# Patient Record
Sex: FEMALE | Race: WHITE | NOT HISPANIC OR LATINO | Employment: OTHER | URBAN - METROPOLITAN AREA
[De-identification: names, ages, dates, MRNs, and addresses within clinical notes are randomized per-mention and may not be internally consistent; named-entity substitution may affect disease eponyms.]

---

## 2019-02-27 ENCOUNTER — HOSPITAL ENCOUNTER (INPATIENT)
Facility: HOSPITAL | Age: 69
LOS: 3 days | Discharge: HOME HEALTH | DRG: 871 | End: 2019-03-02
Attending: FAMILY MEDICINE | Admitting: EMERGENCY MEDICINE
Payer: MEDICARE

## 2019-02-27 DIAGNOSIS — J11.00 PNEUMONIA AND INFLUENZA: Primary | ICD-10-CM

## 2019-02-27 DIAGNOSIS — Z71.6 ENCOUNTER FOR TOBACCO USE CESSATION COUNSELING: ICD-10-CM

## 2019-02-27 DIAGNOSIS — J11.1 INFLUENZA: ICD-10-CM

## 2019-02-27 DIAGNOSIS — R65.20 SEVERE SEPSIS: ICD-10-CM

## 2019-02-27 DIAGNOSIS — R09.02 HYPOXIA: ICD-10-CM

## 2019-02-27 DIAGNOSIS — J18.9 PNEUMONIA: ICD-10-CM

## 2019-02-27 DIAGNOSIS — A41.9 SEVERE SEPSIS: ICD-10-CM

## 2019-02-27 PROBLEM — J96.01 ACUTE RESPIRATORY FAILURE WITH HYPOXIA: Status: ACTIVE | Noted: 2019-02-27

## 2019-02-27 PROBLEM — N28.9 RENAL INSUFFICIENCY: Status: ACTIVE | Noted: 2019-02-27

## 2019-02-27 PROBLEM — J44.1 COPD EXACERBATION: Status: ACTIVE | Noted: 2019-02-27

## 2019-02-27 PROBLEM — I10 ESSENTIAL HYPERTENSION: Status: ACTIVE | Noted: 2019-02-27

## 2019-02-27 LAB
ALBUMIN SERPL BCP-MCNC: 4 G/DL
ALLENS TEST: ABNORMAL
ALP SERPL-CCNC: 78 U/L
ALT SERPL W/O P-5'-P-CCNC: 8 U/L
ANION GAP SERPL CALC-SCNC: 11 MMOL/L
AST SERPL-CCNC: 16 U/L
BASOPHILS # BLD AUTO: 0.02 K/UL
BASOPHILS NFR BLD: 0.3 %
BILIRUB SERPL-MCNC: 0.2 MG/DL
BILIRUB UR QL STRIP: NEGATIVE
BNP SERPL-MCNC: 59 PG/ML
BUN SERPL-MCNC: 12 MG/DL
CALCIUM SERPL-MCNC: 9.1 MG/DL
CHLORIDE SERPL-SCNC: 94 MMOL/L
CLARITY UR: CLEAR
CO2 SERPL-SCNC: 31 MMOL/L
COLOR UR: YELLOW
CREAT SERPL-MCNC: 1.1 MG/DL
DELSYS: ABNORMAL
DIFFERENTIAL METHOD: ABNORMAL
EOSINOPHIL # BLD AUTO: 0 K/UL
EOSINOPHIL NFR BLD: 0.3 %
ERYTHROCYTE [DISTWIDTH] IN BLOOD BY AUTOMATED COUNT: 13.9 %
EST. GFR  (AFRICAN AMERICAN): 60 ML/MIN/1.73 M^2
EST. GFR  (NON AFRICAN AMERICAN): 52 ML/MIN/1.73 M^2
FIO2: 21
GLUCOSE SERPL-MCNC: 111 MG/DL
GLUCOSE UR QL STRIP: NEGATIVE
HCO3 UR-SCNC: 31.4 MMOL/L (ref 24–28)
HCT VFR BLD AUTO: 43.3 %
HGB BLD-MCNC: 13.9 G/DL
HGB UR QL STRIP: ABNORMAL
INFLUENZA A, MOLECULAR: POSITIVE
INFLUENZA B, MOLECULAR: NEGATIVE
KETONES UR QL STRIP: NEGATIVE
LACTATE SERPL-SCNC: 0.8 MMOL/L
LACTATE SERPL-SCNC: 2.3 MMOL/L
LEUKOCYTE ESTERASE UR QL STRIP: NEGATIVE
LYMPHOCYTES # BLD AUTO: 0.9 K/UL
LYMPHOCYTES NFR BLD: 12.2 %
MAGNESIUM SERPL-MCNC: 2.4 MG/DL
MCH RBC QN AUTO: 32.1 PG
MCHC RBC AUTO-ENTMCNC: 32.1 G/DL
MCV RBC AUTO: 100 FL
MODE: ABNORMAL
MONOCYTES # BLD AUTO: 1.2 K/UL
MONOCYTES NFR BLD: 15.7 %
NEUTROPHILS # BLD AUTO: 5.5 K/UL
NEUTROPHILS NFR BLD: 71.5 %
NITRITE UR QL STRIP: NEGATIVE
PCO2 BLDA: 48.4 MMHG (ref 35–45)
PH SMN: 7.42 [PH] (ref 7.35–7.45)
PH UR STRIP: 6 [PH] (ref 5–8)
PLATELET # BLD AUTO: 214 K/UL
PMV BLD AUTO: 9.5 FL
PO2 BLDA: 47 MMHG (ref 80–100)
POC BE: 7 MMOL/L
POC SATURATED O2: 83 % (ref 95–100)
POTASSIUM SERPL-SCNC: 4 MMOL/L
PROCALCITONIN SERPL IA-MCNC: 0.46 NG/ML
PROT SERPL-MCNC: 7.5 G/DL
PROT UR QL STRIP: NEGATIVE
RBC # BLD AUTO: 4.33 M/UL
SAMPLE: ABNORMAL
SITE: ABNORMAL
SODIUM SERPL-SCNC: 136 MMOL/L
SP GR UR STRIP: <=1.005 (ref 1–1.03)
SPECIMEN SOURCE: ABNORMAL
TROPONIN I SERPL DL<=0.01 NG/ML-MCNC: <0.006 NG/ML
URN SPEC COLLECT METH UR: ABNORMAL
UROBILINOGEN UR STRIP-ACNC: NEGATIVE EU/DL
VANCOMYCIN TROUGH SERPL-MCNC: <1.1 UG/ML
WBC # BLD AUTO: 7.69 K/UL

## 2019-02-27 PROCEDURE — 94640 AIRWAY INHALATION TREATMENT: CPT

## 2019-02-27 PROCEDURE — 86803 HEPATITIS C AB TEST: CPT

## 2019-02-27 PROCEDURE — 84145 PROCALCITONIN (PCT): CPT

## 2019-02-27 PROCEDURE — 63600175 PHARM REV CODE 636 W HCPCS: Performed by: FAMILY MEDICINE

## 2019-02-27 PROCEDURE — 87040 BLOOD CULTURE FOR BACTERIA: CPT | Mod: 59

## 2019-02-27 PROCEDURE — 80053 COMPREHEN METABOLIC PANEL: CPT

## 2019-02-27 PROCEDURE — 83880 ASSAY OF NATRIURETIC PEPTIDE: CPT

## 2019-02-27 PROCEDURE — 96375 TX/PRO/DX INJ NEW DRUG ADDON: CPT

## 2019-02-27 PROCEDURE — 85025 COMPLETE CBC W/AUTO DIFF WBC: CPT

## 2019-02-27 PROCEDURE — 80202 ASSAY OF VANCOMYCIN: CPT

## 2019-02-27 PROCEDURE — 83605 ASSAY OF LACTIC ACID: CPT | Mod: 91

## 2019-02-27 PROCEDURE — 87502 INFLUENZA DNA AMP PROBE: CPT

## 2019-02-27 PROCEDURE — 96367 TX/PROPH/DG ADDL SEQ IV INF: CPT

## 2019-02-27 PROCEDURE — 99291 CRITICAL CARE FIRST HOUR: CPT | Mod: 25

## 2019-02-27 PROCEDURE — 25000003 PHARM REV CODE 250: Performed by: EMERGENCY MEDICINE

## 2019-02-27 PROCEDURE — 25000003 PHARM REV CODE 250: Performed by: NURSE PRACTITIONER

## 2019-02-27 PROCEDURE — 63600175 PHARM REV CODE 636 W HCPCS: Performed by: NURSE PRACTITIONER

## 2019-02-27 PROCEDURE — 93005 ELECTROCARDIOGRAM TRACING: CPT

## 2019-02-27 PROCEDURE — 81003 URINALYSIS AUTO W/O SCOPE: CPT

## 2019-02-27 PROCEDURE — 93010 ELECTROCARDIOGRAM REPORT: CPT | Mod: ,,, | Performed by: INTERNAL MEDICINE

## 2019-02-27 PROCEDURE — 25000242 PHARM REV CODE 250 ALT 637 W/ HCPCS: Performed by: NURSE PRACTITIONER

## 2019-02-27 PROCEDURE — 96365 THER/PROPH/DIAG IV INF INIT: CPT

## 2019-02-27 PROCEDURE — 36600 WITHDRAWAL OF ARTERIAL BLOOD: CPT

## 2019-02-27 PROCEDURE — 93010 EKG 12-LEAD: ICD-10-PCS | Mod: ,,, | Performed by: INTERNAL MEDICINE

## 2019-02-27 PROCEDURE — 96361 HYDRATE IV INFUSION ADD-ON: CPT

## 2019-02-27 PROCEDURE — 99900035 HC TECH TIME PER 15 MIN (STAT)

## 2019-02-27 PROCEDURE — G0378 HOSPITAL OBSERVATION PER HR: HCPCS

## 2019-02-27 PROCEDURE — 83735 ASSAY OF MAGNESIUM: CPT

## 2019-02-27 PROCEDURE — 63600175 PHARM REV CODE 636 W HCPCS: Performed by: EMERGENCY MEDICINE

## 2019-02-27 PROCEDURE — 84484 ASSAY OF TROPONIN QUANT: CPT

## 2019-02-27 PROCEDURE — 83605 ASSAY OF LACTIC ACID: CPT

## 2019-02-27 PROCEDURE — 21400001 HC TELEMETRY ROOM

## 2019-02-27 PROCEDURE — 96372 THER/PROPH/DIAG INJ SC/IM: CPT | Mod: 59 | Performed by: FAMILY MEDICINE

## 2019-02-27 PROCEDURE — 25000003 PHARM REV CODE 250: Performed by: FAMILY MEDICINE

## 2019-02-27 PROCEDURE — 82803 BLOOD GASES ANY COMBINATION: CPT

## 2019-02-27 PROCEDURE — 36415 COLL VENOUS BLD VENIPUNCTURE: CPT

## 2019-02-27 PROCEDURE — S4991 NICOTINE PATCH NONLEGEND: HCPCS | Performed by: NURSE PRACTITIONER

## 2019-02-27 PROCEDURE — 96368 THER/DIAG CONCURRENT INF: CPT

## 2019-02-27 RX ORDER — AMLODIPINE BESYLATE 10 MG/1
10 TABLET ORAL DAILY
COMMUNITY

## 2019-02-27 RX ORDER — ACETAMINOPHEN 325 MG/1
650 TABLET ORAL EVERY 8 HOURS PRN
Status: DISCONTINUED | OUTPATIENT
Start: 2019-02-27 | End: 2019-03-02 | Stop reason: HOSPADM

## 2019-02-27 RX ORDER — SODIUM CHLORIDE 9 MG/ML
INJECTION, SOLUTION INTRAVENOUS CONTINUOUS
Status: DISCONTINUED | OUTPATIENT
Start: 2019-02-27 | End: 2019-03-02 | Stop reason: HOSPADM

## 2019-02-27 RX ORDER — METHYLPREDNISOLONE SOD SUCC 125 MG
125 VIAL (EA) INJECTION ONCE
Status: COMPLETED | OUTPATIENT
Start: 2019-02-27 | End: 2019-02-27

## 2019-02-27 RX ORDER — SODIUM CHLORIDE 0.9 % (FLUSH) 0.9 %
5 SYRINGE (ML) INJECTION
Status: DISCONTINUED | OUTPATIENT
Start: 2019-02-27 | End: 2019-03-02 | Stop reason: HOSPADM

## 2019-02-27 RX ORDER — GABAPENTIN 400 MG/1
800 CAPSULE ORAL 3 TIMES DAILY
Status: DISCONTINUED | OUTPATIENT
Start: 2019-02-27 | End: 2019-03-02 | Stop reason: HOSPADM

## 2019-02-27 RX ORDER — MONTELUKAST SODIUM 10 MG/1
10 TABLET ORAL DAILY
COMMUNITY

## 2019-02-27 RX ORDER — BUDESONIDE 0.5 MG/2ML
0.5 INHALANT ORAL EVERY 12 HOURS
Status: DISCONTINUED | OUTPATIENT
Start: 2019-02-27 | End: 2019-03-02 | Stop reason: HOSPADM

## 2019-02-27 RX ORDER — BUDESONIDE AND FORMOTEROL FUMARATE DIHYDRATE 160; 4.5 UG/1; UG/1
2 AEROSOL RESPIRATORY (INHALATION) 2 TIMES DAILY
COMMUNITY
Start: 2019-01-10 | End: 2019-07-09

## 2019-02-27 RX ORDER — FAMOTIDINE 20 MG/1
20 TABLET, FILM COATED ORAL DAILY
Status: DISCONTINUED | OUTPATIENT
Start: 2019-02-27 | End: 2019-02-27

## 2019-02-27 RX ORDER — ONDANSETRON 2 MG/ML
4 INJECTION INTRAMUSCULAR; INTRAVENOUS EVERY 12 HOURS PRN
Status: DISCONTINUED | OUTPATIENT
Start: 2019-02-27 | End: 2019-02-27

## 2019-02-27 RX ORDER — DULOXETIN HYDROCHLORIDE 30 MG/1
60 CAPSULE, DELAYED RELEASE ORAL DAILY
Status: DISCONTINUED | OUTPATIENT
Start: 2019-02-28 | End: 2019-03-02 | Stop reason: HOSPADM

## 2019-02-27 RX ORDER — CYCLOBENZAPRINE HCL 10 MG
10 TABLET ORAL 3 TIMES DAILY PRN
COMMUNITY

## 2019-02-27 RX ORDER — OSELTAMIVIR PHOSPHATE 75 MG/1
75 CAPSULE ORAL
Status: COMPLETED | OUTPATIENT
Start: 2019-02-27 | End: 2019-02-27

## 2019-02-27 RX ORDER — ALBUTEROL SULFATE 90 UG/1
2 AEROSOL, METERED RESPIRATORY (INHALATION) 4 TIMES DAILY
COMMUNITY
Start: 2019-02-14

## 2019-02-27 RX ORDER — IPRATROPIUM BROMIDE AND ALBUTEROL SULFATE 2.5; .5 MG/3ML; MG/3ML
3 SOLUTION RESPIRATORY (INHALATION) EVERY 6 HOURS PRN
COMMUNITY
Start: 2019-01-10

## 2019-02-27 RX ORDER — IPRATROPIUM BROMIDE AND ALBUTEROL SULFATE 2.5; .5 MG/3ML; MG/3ML
3 SOLUTION RESPIRATORY (INHALATION) EVERY 4 HOURS
Status: DISCONTINUED | OUTPATIENT
Start: 2019-02-27 | End: 2019-03-02 | Stop reason: HOSPADM

## 2019-02-27 RX ORDER — GUAIFENESIN/DEXTROMETHORPHAN 100-10MG/5
10 SYRUP ORAL EVERY 4 HOURS PRN
Status: DISCONTINUED | OUTPATIENT
Start: 2019-02-27 | End: 2019-03-01

## 2019-02-27 RX ORDER — GABAPENTIN 800 MG/1
800 TABLET ORAL 3 TIMES DAILY
Refills: 2 | COMMUNITY
Start: 2019-02-19

## 2019-02-27 RX ORDER — DULOXETIN HYDROCHLORIDE 60 MG/1
60 CAPSULE, DELAYED RELEASE ORAL DAILY
COMMUNITY

## 2019-02-27 RX ORDER — ESOMEPRAZOLE MAGNESIUM 40 MG/1
40 CAPSULE, DELAYED RELEASE ORAL DAILY
Refills: 4 | COMMUNITY
Start: 2019-02-18

## 2019-02-27 RX ORDER — ENOXAPARIN SODIUM 100 MG/ML
40 INJECTION SUBCUTANEOUS EVERY 24 HOURS
Status: DISCONTINUED | OUTPATIENT
Start: 2019-02-27 | End: 2019-03-02 | Stop reason: HOSPADM

## 2019-02-27 RX ORDER — MONTELUKAST SODIUM 10 MG/1
10 TABLET ORAL DAILY
Status: DISCONTINUED | OUTPATIENT
Start: 2019-02-28 | End: 2019-03-02 | Stop reason: HOSPADM

## 2019-02-27 RX ORDER — ESZOPICLONE 3 MG/1
3 TABLET, FILM COATED ORAL NIGHTLY
Refills: 2 | COMMUNITY
Start: 2019-02-05

## 2019-02-27 RX ORDER — ALPRAZOLAM 1 MG/1
1 TABLET ORAL 2 TIMES DAILY PRN
Refills: 2 | COMMUNITY
Start: 2019-02-20 | End: 2019-12-07

## 2019-02-27 RX ORDER — IPRATROPIUM BROMIDE AND ALBUTEROL SULFATE 2.5; .5 MG/3ML; MG/3ML
3 SOLUTION RESPIRATORY (INHALATION) EVERY 4 HOURS
Status: DISCONTINUED | OUTPATIENT
Start: 2019-02-27 | End: 2019-02-27

## 2019-02-27 RX ORDER — PANTOPRAZOLE SODIUM 40 MG/1
40 TABLET, DELAYED RELEASE ORAL DAILY
Status: DISCONTINUED | OUTPATIENT
Start: 2019-02-28 | End: 2019-03-02 | Stop reason: HOSPADM

## 2019-02-27 RX ORDER — HYDROCODONE BITARTRATE AND ACETAMINOPHEN 10; 325 MG/1; MG/1
1 TABLET ORAL EVERY 4 HOURS PRN
Status: DISCONTINUED | OUTPATIENT
Start: 2019-02-27 | End: 2019-03-02 | Stop reason: HOSPADM

## 2019-02-27 RX ORDER — ONDANSETRON 2 MG/ML
4 INJECTION INTRAMUSCULAR; INTRAVENOUS EVERY 8 HOURS PRN
Status: DISCONTINUED | OUTPATIENT
Start: 2019-02-27 | End: 2019-03-02 | Stop reason: HOSPADM

## 2019-02-27 RX ORDER — HYDROCODONE BITARTRATE AND ACETAMINOPHEN 10; 325 MG/1; MG/1
1 TABLET ORAL EVERY 4 HOURS PRN
Status: ON HOLD | COMMUNITY
End: 2019-03-02 | Stop reason: HOSPADM

## 2019-02-27 RX ORDER — AZELASTINE 1 MG/ML
2 SPRAY, METERED NASAL 2 TIMES DAILY
Refills: 2 | COMMUNITY
Start: 2019-02-20

## 2019-02-27 RX ORDER — METHYLPREDNISOLONE SOD SUCC 125 MG
60 VIAL (EA) INJECTION EVERY 8 HOURS
Status: DISCONTINUED | OUTPATIENT
Start: 2019-02-27 | End: 2019-03-01

## 2019-02-27 RX ORDER — ALPRAZOLAM 1 MG/1
1 TABLET ORAL 2 TIMES DAILY PRN
Status: DISCONTINUED | OUTPATIENT
Start: 2019-02-27 | End: 2019-03-02 | Stop reason: HOSPADM

## 2019-02-27 RX ORDER — IBUPROFEN 200 MG
1 TABLET ORAL DAILY
Status: DISCONTINUED | OUTPATIENT
Start: 2019-02-27 | End: 2019-03-02 | Stop reason: HOSPADM

## 2019-02-27 RX ORDER — BUDESONIDE AND FORMOTEROL FUMARATE DIHYDRATE 160; 4.5 UG/1; UG/1
1 AEROSOL RESPIRATORY (INHALATION) 2 TIMES DAILY
Refills: 5 | COMMUNITY
Start: 2019-02-23

## 2019-02-27 RX ADMIN — BUDESONIDE 0.5 MG: 0.5 SUSPENSION RESPIRATORY (INHALATION) at 09:02

## 2019-02-27 RX ADMIN — OSELTAMIVIR PHOSPHATE 75 MG: 75 CAPSULE ORAL at 04:02

## 2019-02-27 RX ADMIN — ENOXAPARIN SODIUM 40 MG: 100 INJECTION SUBCUTANEOUS at 09:02

## 2019-02-27 RX ADMIN — AZITHROMYCIN MONOHYDRATE 500 MG: 500 INJECTION, POWDER, LYOPHILIZED, FOR SOLUTION INTRAVENOUS at 04:02

## 2019-02-27 RX ADMIN — IPRATROPIUM BROMIDE AND ALBUTEROL SULFATE 3 ML: .5; 3 SOLUTION RESPIRATORY (INHALATION) at 09:02

## 2019-02-27 RX ADMIN — GABAPENTIN 800 MG: 400 CAPSULE ORAL at 09:02

## 2019-02-27 RX ADMIN — SODIUM CHLORIDE 1494 ML: 0.9 INJECTION, SOLUTION INTRAVENOUS at 03:02

## 2019-02-27 RX ADMIN — SODIUM CHLORIDE: 0.9 INJECTION, SOLUTION INTRAVENOUS at 06:02

## 2019-02-27 RX ADMIN — Medication 1 PATCH: at 09:02

## 2019-02-27 RX ADMIN — CEFTRIAXONE 2 G: 2 INJECTION, SOLUTION INTRAVENOUS at 04:02

## 2019-02-27 RX ADMIN — PIPERACILLIN AND TAZOBACTAM 4.5 G: 4; .5 INJECTION, POWDER, LYOPHILIZED, FOR SOLUTION INTRAVENOUS; PARENTERAL at 06:02

## 2019-02-27 RX ADMIN — METHYLPREDNISOLONE SODIUM SUCCINATE 125 MG: 125 INJECTION, POWDER, FOR SOLUTION INTRAMUSCULAR; INTRAVENOUS at 06:02

## 2019-02-27 RX ADMIN — FAMOTIDINE 20 MG: 20 TABLET, FILM COATED ORAL at 06:02

## 2019-02-27 NOTE — ED PROVIDER NOTES
SCRIBE #1 NOTE: I, Corinne Mack, am scribing for, and in the presence of, Cyndi Rubin MD. I have scribed the HPI, ROS, and PEx.     SCRIBE #2 NOTE: I, Eleonora Hernandez, am scribing for, and in the presence of,  Shayne Salvador Jr., MD. I have scribed the remaining portions of the note not scribed by Scribe #1.     History      Chief Complaint   Patient presents with    Influenza     + Flu / + pneumonia per [facility] clinic. Clinic states they couldnt get sats >90% after breathing treatments.        Review of patient's allergies indicates:  No Known Allergies     HPI   HPI    2/27/2019, 3:12 PM   History obtained from the patient      History of Present Illness: Jill Minaya is a 68 y.o. female patient who presents to the Emergency Department for further evaluation. Pt c/o cough which onset yesterday. Pt presented to [facility] today and was Dx with PNA and influenza A. Pt was referred to the ED for further evaluation. Symptoms are episodic and moderate in severity. No mitigating or exacerbating factors reported. Associated sxs include fever (Tmax 103.6). Patient denies any chills, congestion, rhinorrhea, Cp, SOB, N/V/D, abd pain, back pain, neck pain, HA, dizziness, and all other sxs at this time. Prior Tx includes Robitussin with little relief. No further complaints or concerns at this time.         Arrival mode: Personal vehicle    PCP: Ruth Ann Zhu MD       Past Medical History:  Past medical history reviewed not relevant      Past Surgical History:  Past surgical history reviewed not relevant      Family History:  Family history reviewed not relevant      Social History:  Social History    Social History Main Topics    Social History Main Topics    Smoking status: Unknown if ever smoked    Smokeless tobacco: Unknown if ever used    Alcohol Use: Unknown drinking history    Drug Use: Unknown if ever used    Sexual Activity: Unknown         ROS   Review of Systems   Constitutional: Positive for  fever (Tmax 103.6). Negative for chills.   Respiratory: Positive for cough. Negative for shortness of breath.    Cardiovascular: Negative for chest pain and leg swelling.   Gastrointestinal: Negative for abdominal pain, diarrhea, nausea and vomiting.   Musculoskeletal: Negative for back pain, neck pain and neck stiffness.   Skin: Negative for rash and wound.   Neurological: Negative for dizziness, light-headedness, numbness and headaches.   All other systems reviewed and are negative.    Physical Exam      Initial Vitals [02/27/19 1455]   BP Pulse Resp Temp SpO2   (!) 91/54 (!) 111 18 98.2 °F (36.8 °C) (!) 84 %      MAP       --          Physical Exam  Nursing Notes and Vital Signs Reviewed.  Constitutional: Patient is in no acute distress. Well-developed and well-nourished.  Head: Atraumatic. Normocephalic.  Eyes: PERRL. EOM intact. Conjunctivae are not pale. No scleral icterus.  ENT: Mucous membranes are moist. Oropharynx is clear and symmetric.    Neck: Supple. Full ROM. No lymphadenopathy.  Cardiovascular: Tachycardic. Regular rhythm. No murmurs, rubs, or gallops. Distal pulses are 2+ and symmetric.  Pulmonary/Chest: No respiratory distress. Inspiratory and expiratory wheezes bilaterally with crackles noted.  Abdominal: Soft and non-distended.  There is no tenderness.  No rebound, guarding, or rigidity.   Musculoskeletal: Moves all extremities. No obvious deformities. No edema. No calf tenderness.  Skin: Warm and dry.  Neurological:  Alert, awake, and appropriate.  Normal speech.  No acute focal neurological deficits are appreciated.  Psychiatric: Normal affect. Good eye contact. Appropriate in content.    ED Course    Critical Care  Date/Time: 2/27/2019 5:50 PM  Performed by: Shayne Salvador Jr., MD  Authorized by: Shayne Salvador Jr., MD   Direct patient critical care time: 10 minutes  Additional history critical care time: 4 minutes  Ordering / reviewing critical care time: 4 minutes  Documentation critical  care time: 4 minutes  Consulting other physicians critical care time: 4 minutes  Consult with family critical care time: 4 minutes  Total critical care time (exclusive of procedural time) : 30 minutes  Critical care time was exclusive of separately billable procedures and treating other patients and teaching time.  Critical care was necessary to treat or prevent imminent or life-threatening deterioration of the following conditions: sepsis (Pneumonia and influenza).  Critical care was time spent personally by me on the following activities: blood draw for specimens, development of treatment plan with patient or surrogate, discussions with consultants, interpretation of cardiac output measurements, evaluation of patient's response to treatment, examination of patient, obtaining history from patient or surrogate, ordering and performing treatments and interventions, ordering and review of laboratory studies, ordering and review of radiographic studies, pulse oximetry, re-evaluation of patient's condition and review of old charts.        ED Vital Signs:  Vitals:    02/27/19 1455 02/27/19 1520 02/27/19 1521 02/27/19 1701   BP: (!) 91/54  (!) 91/55 (!) 112/51   Pulse: (!) 111 95 93 91   Resp: 18  (!) 21 16   Temp: 98.2 °F (36.8 °C)      TempSrc: Oral      SpO2: (!) 84%  98% 100%   Weight: 49.8 kg (109 lb 12.6 oz)      Height: 5' (1.524 m)          Abnormal Lab Results:  Labs Reviewed   CBC W/ AUTO DIFFERENTIAL - Abnormal; Notable for the following components:       Result Value     (*)     MCH 32.1 (*)     Lymph # 0.9 (*)     Mono # 1.2 (*)     Lymph% 12.2 (*)     Mono% 15.7 (*)     All other components within normal limits   COMPREHENSIVE METABOLIC PANEL - Abnormal; Notable for the following components:    Chloride 94 (*)     CO2 31 (*)     Glucose 111 (*)     ALT 8 (*)     eGFR if non  52 (*)     All other components within normal limits   LACTIC ACID, PLASMA - Abnormal; Notable for the following  components:    Lactate (Lactic Acid) 2.3 (*)     All other components within normal limits   URINALYSIS, REFLEX TO URINE CULTURE - Abnormal; Notable for the following components:    Specific Gravity, UA <=1.005 (*)     Occult Blood UA Trace (*)     All other components within normal limits    Narrative:     Preferred Collection Type->Urine, Clean Catch   PROCALCITONIN - Abnormal; Notable for the following components:    Procalcitonin 0.46 (*)     All other components within normal limits   ISTAT PROCEDURE - Abnormal; Notable for the following components:    POC PCO2 48.4 (*)     POC PO2 47 (*)     POC HCO3 31.4 (*)     POC SATURATED O2 83 (*)     All other components within normal limits   CULTURE, BLOOD   CULTURE, BLOOD   INFLUENZA A & B BY MOLECULAR   MAGNESIUM   B-TYPE NATRIURETIC PEPTIDE   TROPONIN I   HEPATITIS C ANTIBODY   LACTIC ACID, PLASMA        All Lab Results:  Results for orders placed or performed during the hospital encounter of 02/27/19   CBC auto differential   Result Value Ref Range    WBC 7.69 3.90 - 12.70 K/uL    RBC 4.33 4.00 - 5.40 M/uL    Hemoglobin 13.9 12.0 - 16.0 g/dL    Hematocrit 43.3 37.0 - 48.5 %     (H) 82 - 98 fL    MCH 32.1 (H) 27.0 - 31.0 pg    MCHC 32.1 32.0 - 36.0 g/dL    RDW 13.9 11.5 - 14.5 %    Platelets 214 150 - 350 K/uL    MPV 9.5 9.2 - 12.9 fL    Gran # (ANC) 5.5 1.8 - 7.7 K/uL    Lymph # 0.9 (L) 1.0 - 4.8 K/uL    Mono # 1.2 (H) 0.3 - 1.0 K/uL    Eos # 0.0 0.0 - 0.5 K/uL    Baso # 0.02 0.00 - 0.20 K/uL    Gran% 71.5 38.0 - 73.0 %    Lymph% 12.2 (L) 18.0 - 48.0 %    Mono% 15.7 (H) 4.0 - 15.0 %    Eosinophil% 0.3 0.0 - 8.0 %    Basophil% 0.3 0.0 - 1.9 %    Differential Method Automated    Comprehensive metabolic panel   Result Value Ref Range    Sodium 136 136 - 145 mmol/L    Potassium 4.0 3.5 - 5.1 mmol/L    Chloride 94 (L) 95 - 110 mmol/L    CO2 31 (H) 23 - 29 mmol/L    Glucose 111 (H) 70 - 110 mg/dL    BUN, Bld 12 8 - 23 mg/dL    Creatinine 1.1 0.5 - 1.4 mg/dL     Calcium 9.1 8.7 - 10.5 mg/dL    Total Protein 7.5 6.0 - 8.4 g/dL    Albumin 4.0 3.5 - 5.2 g/dL    Total Bilirubin 0.2 0.1 - 1.0 mg/dL    Alkaline Phosphatase 78 55 - 135 U/L    AST 16 10 - 40 U/L    ALT 8 (L) 10 - 44 U/L    Anion Gap 11 8 - 16 mmol/L    eGFR if African American 60 >60 mL/min/1.73 m^2    eGFR if non African American 52 (A) >60 mL/min/1.73 m^2   Lactic acid, plasma #1   Result Value Ref Range    Lactate (Lactic Acid) 2.3 (H) 0.5 - 2.2 mmol/L   Urinalysis, Reflex to Urine Culture Urine, Clean Catch   Result Value Ref Range    Specimen UA Urine, Clean Catch     Color, UA Yellow Yellow, Straw, [place]    Appearance, UA Clear Clear    pH, UA 6.0 5.0 - 8.0    Specific Gravity, UA <=1.005 (A) 1.005 - 1.030    Protein, UA Negative Negative    Glucose, UA Negative Negative    Ketones, UA Negative Negative    Bilirubin (UA) Negative Negative    Occult Blood UA Trace (A) Negative    Nitrite, UA Negative Negative    Urobilinogen, UA Negative <2.0 EU/dL    Leukocytes, UA Negative Negative   Magnesium   Result Value Ref Range    Magnesium 2.4 1.6 - 2.6 mg/dL   Brain natriuretic peptide   Result Value Ref Range    BNP 59 0 - 99 pg/mL   Troponin I   Result Value Ref Range    Troponin I <0.006 0.000 - 0.026 ng/mL   Procalcitonin   Result Value Ref Range    Procalcitonin 0.46 (H) <0.25 ng/mL   ISTAT PROCEDURE   Result Value Ref Range    POC PH 7.420 7.35 - 7.45    POC PCO2 48.4 (H) 35 - 45 mmHg    POC PO2 47 (LL) 80 - 100 mmHg    POC HCO3 31.4 (H) 24 - 28 mmol/L    POC BE 7 -2 to 2 mmol/L    POC SATURATED O2 83 (L) 95 - 100 %    Sample ARTERIAL     Site LR     Allens Test Pass     DelSys Room Air     Mode SPONT     FiO2 21        Imaging Results:  Imaging Results          X-Ray Chest PA And Lateral (Final result)  Result time 02/27/19 16:58:09    Final result by BRADLEY Dow Sr., MD (02/27/19 16:58:09)                 Impression:      1. There has been interval development of a mild amount of interstitial opacities  seen in both lungs with Kerley B-lines visualized bilaterally.  This is characteristic of pulmonary edema.  2. There are mild degenerative changes in the inferior aspect of the thoracic spine.  3. There is partial visualization of posterior spinal fusion hardware in the lumbar spine.  .      Electronically signed by: Emir Dow MD  Date:    02/27/2019  Time:    16:58             Narrative:    EXAMINATION:  XR CHEST PA AND LATERAL    CLINICAL HISTORY:  Pneumonia, unspecified organism    COMPARISON:  01/05/2010    FINDINGS:  The size and contour of the heart are normal.  There has been interval development of a mild amount of interstitial opacities seen in both lungs with Kerley B-lines visualized bilaterally.  There is no pneumothorax or pleural effusion.  There are mild degenerative changes in the inferior aspect of the thoracic spine.  There is partial visualization of posterior spinal fusion hardware in the lumbar spine.                             [facility]  [address]  [place] [address]  [phone] / [phone] FAX    Radiology Interpretation  PATIENT NAME: JUJU ALBARADO  YOB: 1950  ID/MRN: [id number]  CLINICIAN: Odette Jay  FACILITY: [facility]  DATE OF EXAM: 02/27/2019  HISTORY: [id number],    SIGNIFICANT FINDINGS  Chest X-Ray 2V:    Findings: The trachea is midline. The cardiac silhouette is normal. The lungs are mildly hyperinflated. Prominent bronchovascular markings. Biapical pleural thickening. Density seen in the lower lung zone, likely in the left lower lobe. Aortic atherosclerosis. No pneumothorax.     IMPRESSION:    1. Chronic airways disease.  2. Superimposed left lower lobe pneumonitis. Recommend follow-up.    Electronically Signed By: Dr. Louise Alves M.D. 02/27/2019 12:25:29 CST      Dr. Alves notified [employer] of significant findings. [facility] [employer] [facility] notified [facility]  [phone]) at [facility] of significant findings at 2019-02-27 12:31:31.    Tech: [email or web address]        The EKG was ordered, reviewed, and independently interpreted by the ED provider.  Interpretation time: 1513  Rate: 99 BPM  Rhythm: normal sinus rhythm  Interpretation: Biatrial enlargement. No STEMI.             The Emergency Provider reviewed the vital signs and test results, which are outlined above.    ED Discussion     4:00 PM: Dr. Rubin transfers care of pt to Dr. Salvador, pending lab results.    4:49 PM  Sepsis focused examination performed.  Vital signs have been reviewed.  Patient's skin is non mottled and non tenting.  Normal turgor. Normal pulses in extremities in normal capillary refill.  Cardiopulmonary exam is unchanged.  The patient did desaturate when she stood down to 78%.  Blood pressures improved 105 systolic.    5:49 PM: Discussed case with Morgan Lemus NP ([facility]). Dr. Reagan agrees with current care and management of pt and accepts admission.   Admitting Service: Hospital medicine   Admitting Physician: Dr. Reagan  Admit to: Obs    5:51 PM: Re-evaluated pt. I have discussed test results, shared treatment plan, and the need for admission with patient and family at bedside. Pt and family express understanding at this time and agree with all information. All questions answered. Pt and family have no further questions or concerns at this time. Pt is ready for admit.    5:54 PM  Patient is admitted for sepsis due to pneumonia and influenza.  Patient's hypoxia when she attempts to move around stand.  She presented hypotensive as well. After further discussion, patient had been in the hospital recently.  No change the antibiotics to include vanc and Zosyn at this time.  I have counseled the patient quit smoking as well at length.    ED Medication(s):  Medications   sodium chloride 0.9% bolus 1,494 mL (0 mL/kg × 49.8 kg Intravenous Stopped 2/27/19 4489)    oseltamivir capsule 75 mg (75 mg Oral Given 2/27/19 1600)   cefTRIAXone (ROCEPHIN) 2 g in dextrose 5 % 50 mL IVPB (0 g Intravenous Stopped 2/27/19 1701)   azithromycin 500 mg in dextrose 5 % 250 mL IVPB (ready to mix system) (0 mg Intravenous Stopped 2/27/19 1701)          Medication List      ASK your doctor about these medications    albuterol-ipratropium 2.5 mg-0.5 mg/3 mL nebulizer solution  Commonly known as:  DUO-NEB     ALPRAZolam 1 MG tablet  Commonly known as:  XANAX     amLODIPine 10 MG tablet  Commonly known as:  NORVASC     azelastine 137 mcg (0.1 %) nasal spray  Commonly known as:  ASTELIN     * budesonide-formoterol 160-4.5 mcg 160-4.5 mcg/actuation Hfaa  Commonly known as:  SYMBICORT     * SYMBICORT 160-4.5 mcg/actuation Hfaa  Generic drug:  budesonide-formoterol 160-4.5 mcg     cyclobenzaprine 10 MG tablet  Commonly known as:  FLEXERIL     DULoxetine 60 MG capsule  Commonly known as:  CYMBALTA     esomeprazole 40 MG capsule  Commonly known as:  NEXIUM     eszopiclone 3 mg Tab  Commonly known as:  LUNESTA     gabapentin 800 MG tablet  Commonly known as:  NEURONTIN     HYDROcodone-acetaminophen  mg per tablet  Commonly known as:  NORCO     ipratropium 17 mcg/actuation inhaler  Commonly known as:  ATROVENT HFA     montelukast 10 mg tablet  Commonly known as:  SINGULAIR     PROAIR HFA 90 mcg/actuation inhaler  Generic drug:  albuterol         * This list has 2 medication(s) that are the same as other medications prescribed for you. Read the directions carefully, and ask your doctor or other care provider to review them with you.                      Medical Decision Making    Medical Decision Making:   Clinical Tests:   Lab Tests: Ordered and Reviewed  Radiological Study: Ordered and Reviewed  Medical Tests: Ordered and Reviewed     Additional MDM:   Smoking Cessation: The patient is a smoker. The patient was counseled on smoking cessation for: 5 minutes. The patient was counseled on tobacco  related  health complications.        Scribe Attestation:   Scribe #1: I performed the above scribed service and the documentation accurately describes the services I performed. I attest to the accuracy of the note 02/27/2019.    Attending:   Physician Attestation Statement for Scribe #1: I, Cyndi Rubin MD, personally performed the services described in this documentation, as scribed by Corinne Mack, in my presence, and it is both accurate and complete.       Scribe Attestation:   Scribe #2: I performed the above scribed service and the documentation accurately describes the services I performed. I attest to the accuracy of the note.    Attending Attestation:           Physician Attestation for Scribe:    Physician Attestation Statement for Scribe #2: I, Shayne Salvador Jr., MD, reviewed documentation, as scribed by Eleonora Hernandez in my presence, and it is both accurate and complete. I also acknowledge and confirm the content of the note done by Scribe #1.          Clinical Impression       ICD-10-CM ICD-9-CM   1. Pneumonia and influenza J11.00 487.0   2. Influenza J11.1 487.1   3. Pneumonia J18.9 486   4. Severe sepsis A41.9 038.9    R65.20 995.92   5. Encounter for tobacco use cessation counseling Z71.6 V65.42   6. Hypoxia R09.02 799.02       Disposition:   Disposition: Placed in Observation  Condition: Sophia Salvador Jr., MD  02/27/19 3660

## 2019-02-28 LAB
ALBUMIN SERPL BCP-MCNC: 3.2 G/DL
ALP SERPL-CCNC: 58 U/L
ALT SERPL W/O P-5'-P-CCNC: 8 U/L
ANION GAP SERPL CALC-SCNC: 7 MMOL/L
AST SERPL-CCNC: 13 U/L
BASOPHILS # BLD AUTO: 0 K/UL
BASOPHILS NFR BLD: 0 %
BILIRUB SERPL-MCNC: 0.2 MG/DL
BUN SERPL-MCNC: 9 MG/DL
CALCIUM SERPL-MCNC: 8.5 MG/DL
CHLORIDE SERPL-SCNC: 101 MMOL/L
CO2 SERPL-SCNC: 31 MMOL/L
CREAT SERPL-MCNC: 0.6 MG/DL
DIFFERENTIAL METHOD: ABNORMAL
EOSINOPHIL # BLD AUTO: 0 K/UL
EOSINOPHIL NFR BLD: 0 %
ERYTHROCYTE [DISTWIDTH] IN BLOOD BY AUTOMATED COUNT: 13.8 %
EST. GFR  (AFRICAN AMERICAN): >60 ML/MIN/1.73 M^2
EST. GFR  (NON AFRICAN AMERICAN): >60 ML/MIN/1.73 M^2
GLUCOSE SERPL-MCNC: 123 MG/DL
HCT VFR BLD AUTO: 38.4 %
HCV AB SERPL QL IA: NEGATIVE
HGB BLD-MCNC: 12.2 G/DL
LYMPHOCYTES # BLD AUTO: 0.5 K/UL
LYMPHOCYTES NFR BLD: 12.1 %
MCH RBC QN AUTO: 31.6 PG
MCHC RBC AUTO-ENTMCNC: 31.8 G/DL
MCV RBC AUTO: 100 FL
MONOCYTES # BLD AUTO: 0.2 K/UL
MONOCYTES NFR BLD: 4.1 %
NEUTROPHILS # BLD AUTO: 3.3 K/UL
NEUTROPHILS NFR BLD: 83.8 %
PLATELET # BLD AUTO: 187 K/UL
PMV BLD AUTO: 9.5 FL
POTASSIUM SERPL-SCNC: 3.8 MMOL/L
PROT SERPL-MCNC: 6 G/DL
RBC # BLD AUTO: 3.86 M/UL
SODIUM SERPL-SCNC: 139 MMOL/L
VANCOMYCIN SERPL-MCNC: <1.1 UG/ML
WBC # BLD AUTO: 3.88 K/UL

## 2019-02-28 PROCEDURE — 63600175 PHARM REV CODE 636 W HCPCS: Performed by: NURSE PRACTITIONER

## 2019-02-28 PROCEDURE — 96375 TX/PRO/DX INJ NEW DRUG ADDON: CPT | Performed by: FAMILY MEDICINE

## 2019-02-28 PROCEDURE — 27000221 HC OXYGEN, UP TO 24 HOURS

## 2019-02-28 PROCEDURE — 25000242 PHARM REV CODE 250 ALT 637 W/ HCPCS: Performed by: NURSE PRACTITIONER

## 2019-02-28 PROCEDURE — 87070 CULTURE OTHR SPECIMN AEROBIC: CPT

## 2019-02-28 PROCEDURE — 25000003 PHARM REV CODE 250: Performed by: NURSE PRACTITIONER

## 2019-02-28 PROCEDURE — 87205 SMEAR GRAM STAIN: CPT

## 2019-02-28 PROCEDURE — 80202 ASSAY OF VANCOMYCIN: CPT

## 2019-02-28 PROCEDURE — S4991 NICOTINE PATCH NONLEGEND: HCPCS | Performed by: NURSE PRACTITIONER

## 2019-02-28 PROCEDURE — 94640 AIRWAY INHALATION TREATMENT: CPT

## 2019-02-28 PROCEDURE — 94761 N-INVAS EAR/PLS OXIMETRY MLT: CPT

## 2019-02-28 PROCEDURE — 36415 COLL VENOUS BLD VENIPUNCTURE: CPT

## 2019-02-28 PROCEDURE — 21400001 HC TELEMETRY ROOM

## 2019-02-28 PROCEDURE — 85025 COMPLETE CBC W/AUTO DIFF WBC: CPT

## 2019-02-28 PROCEDURE — 25000003 PHARM REV CODE 250: Performed by: EMERGENCY MEDICINE

## 2019-02-28 PROCEDURE — 96361 HYDRATE IV INFUSION ADD-ON: CPT | Performed by: FAMILY MEDICINE

## 2019-02-28 PROCEDURE — 80053 COMPREHEN METABOLIC PANEL: CPT

## 2019-02-28 RX ADMIN — IPRATROPIUM BROMIDE AND ALBUTEROL SULFATE 3 ML: .5; 3 SOLUTION RESPIRATORY (INHALATION) at 12:02

## 2019-02-28 RX ADMIN — ALPRAZOLAM 1 MG: 1 TABLET ORAL at 08:02

## 2019-02-28 RX ADMIN — GABAPENTIN 800 MG: 400 CAPSULE ORAL at 02:02

## 2019-02-28 RX ADMIN — HYDROCODONE BITARTRATE AND ACETAMINOPHEN 1 TABLET: 10; 325 TABLET ORAL at 11:02

## 2019-02-28 RX ADMIN — METHYLPREDNISOLONE SODIUM SUCCINATE 60 MG: 125 INJECTION, POWDER, FOR SOLUTION INTRAMUSCULAR; INTRAVENOUS at 06:02

## 2019-02-28 RX ADMIN — MONTELUKAST SODIUM 10 MG: 10 TABLET, FILM COATED ORAL at 08:02

## 2019-02-28 RX ADMIN — Medication 1 PATCH: at 09:02

## 2019-02-28 RX ADMIN — BUDESONIDE 0.5 MG: 0.5 SUSPENSION RESPIRATORY (INHALATION) at 07:02

## 2019-02-28 RX ADMIN — METHYLPREDNISOLONE SODIUM SUCCINATE 60 MG: 125 INJECTION, POWDER, FOR SOLUTION INTRAMUSCULAR; INTRAVENOUS at 08:02

## 2019-02-28 RX ADMIN — HYDROCODONE BITARTRATE AND ACETAMINOPHEN 1 TABLET: 10; 325 TABLET ORAL at 08:02

## 2019-02-28 RX ADMIN — IPRATROPIUM BROMIDE AND ALBUTEROL SULFATE 3 ML: .5; 3 SOLUTION RESPIRATORY (INHALATION) at 04:02

## 2019-02-28 RX ADMIN — METHYLPREDNISOLONE SODIUM SUCCINATE 60 MG: 125 INJECTION, POWDER, FOR SOLUTION INTRAMUSCULAR; INTRAVENOUS at 01:02

## 2019-02-28 RX ADMIN — ENOXAPARIN SODIUM 40 MG: 100 INJECTION SUBCUTANEOUS at 05:02

## 2019-02-28 RX ADMIN — DULOXETINE 60 MG: 30 CAPSULE, DELAYED RELEASE ORAL at 08:02

## 2019-02-28 RX ADMIN — AZITHROMYCIN MONOHYDRATE 500 MG: 500 INJECTION, POWDER, LYOPHILIZED, FOR SOLUTION INTRAVENOUS at 03:02

## 2019-02-28 RX ADMIN — GABAPENTIN 800 MG: 400 CAPSULE ORAL at 08:02

## 2019-02-28 RX ADMIN — IPRATROPIUM BROMIDE AND ALBUTEROL SULFATE 3 ML: .5; 3 SOLUTION RESPIRATORY (INHALATION) at 07:02

## 2019-02-28 RX ADMIN — BUDESONIDE 0.5 MG: 0.5 SUSPENSION RESPIRATORY (INHALATION) at 08:02

## 2019-02-28 RX ADMIN — SODIUM CHLORIDE: 0.9 INJECTION, SOLUTION INTRAVENOUS at 06:02

## 2019-02-28 RX ADMIN — ACETAMINOPHEN 650 MG: 325 TABLET ORAL at 03:02

## 2019-02-28 RX ADMIN — IPRATROPIUM BROMIDE AND ALBUTEROL SULFATE 3 ML: .5; 3 SOLUTION RESPIRATORY (INHALATION) at 08:02

## 2019-02-28 RX ADMIN — ALPRAZOLAM 1 MG: 1 TABLET ORAL at 03:02

## 2019-02-28 RX ADMIN — PANTOPRAZOLE SODIUM 40 MG: 40 TABLET, DELAYED RELEASE ORAL at 08:02

## 2019-02-28 RX ADMIN — ACETAMINOPHEN 650 MG: 325 TABLET ORAL at 02:02

## 2019-02-28 RX ADMIN — CEFTRIAXONE 2 G: 2 INJECTION, SOLUTION INTRAVENOUS at 02:02

## 2019-02-28 NOTE — PLAN OF CARE
Sw met with patient at the bedside. Sw explained role/purpose of visit. Patient reports she lives alone and is independent with ADL's. Patient reports she has HH, unsure of the name and currently has oxygen.  Updated white board with 's name and number. Transitional Care Folder, Discharge Planning Begins on Admission pamphlet, [facility] Bedside Delivery pamphlet, Advance Directive information given to patient along with the contact information given.Instructed patient or family to call with any questions or concerns.    Transportation: spouse  Pharmacy[facility]  PCP: Dr. Ruth Ann Zhu     02/28/19 2618   Discharge Assessment   Assessment Type Discharge Planning Assessment   Confirmed/corrected address and phone number on facesheet? Yes   Assessment information obtained from? Patient   Prior to hospitilization cognitive status: Alert/Oriented   Prior to hospitalization functional status: Independent   Current cognitive status: Alert/Oriented   Current Functional Status: Independent   Lives With alone   Able to Return to Prior Arrangements yes   Is patient able to care for self after discharge? Yes   Who are your caregiver(s) and their phone number(s)? Jose Minaya; spouse [phone]   Equipment Currently Used at Home oxygen   Does the patient have transportation home? Yes   Transportation Anticipated family or friend will provide   Does the patient receive services at the [facility]? No   Discharge Plan A Home   Discharge Plan B Home with family;Home Health   Patient/Family in Agreement with Plan yes

## 2019-02-28 NOTE — ASSESSMENT & PLAN NOTE
- (+) influenza at [facility] prior to arrival today  - Influenza pending  - Continue PO Tamiflu

## 2019-02-28 NOTE — ASSESSMENT & PLAN NOTE
- Borderline hypotensive  - Hold Amlodipine at this time. Will continue to monitor BP and will restart once BP can tolerate

## 2019-02-28 NOTE — PLAN OF CARE
Problem: Adult Inpatient Plan of Care  Goal: Plan of Care Review  Outcome: Ongoing (interventions implemented as appropriate)  Pt is on nc 3l/m; tolerates txs well; strong loose cough.

## 2019-02-28 NOTE — ED NOTES
In bed resting,aaox3,skin warm dry to touch,equal bilateral chest rise and fall, patient denies shortness of breath at this time lung sounds reveal course breath sounds in all lung fields ,side rails up x 2,bed locked and in low position,C-monitor on and recording 75 normal sinus,instructed to call with any needs.

## 2019-02-28 NOTE — ASSESSMENT & PLAN NOTE
- Pt is followed by Dr. Madelin Dos Santos (pulmonologist)  - IV steroids, ICS, LABA/SAKSHI, supplemental oxygen  - Last saw physician on 02/19/19 -- Per care everywhere -- performed 6-minute walk test and pulmonary function testing prior to clinic.   FEV1 is 27% consistent with very severe obstructive lung disease. However she did not require supplemental oxygen on her 6-minute walk test. He informed her that she no longer needs to use oxygen with exertion or at rest. She does claim to feel better with using oxygen at nighttime and hence I told her that she could continue to use it if she derived subjective benefit from it  - Obtain home oxygen evaluation prior to discharge if unable to wean

## 2019-02-28 NOTE — H&P
[facility] Hospital Medicine  History & Physical    Patient Name: Jill Minaya  MRN: [id number]  Admission Date: 2/27/2019  Attending Physician: Shayne Salvador Jr., MD   Primary Care Provider: Ruth Ann Zhu MD         Patient information was obtained from patient, spouse/SO and ER records.     Subjective:     Principal Problem:Acute respiratory failure with hypoxia    Chief Complaint:   Chief Complaint   Patient presents with    Influenza     + Flu / + pneumonia per [facility] clinic. Clinic states they couldnt get sats >90% after breathing treatments.         HPI: Jill Minaya is a 68 year old female with a PMHx of HTN, Asthma, COPD, Chronic back pain, and former tobacco user (quit 2 weeks ago) who presented to the Emergency Department with c/o SOB. Associated symptoms include: subjective fever (103.6), chills, productive cough with yellow sputum, and body aches. Pt reports symptoms started yesterday. Went to [facility] today and diagnosed with Flu and pneumonia. Pt was sent to ED due to hypoxia. ED workup showed:  Normal WBC count, Hgb/Hct 13.9/43.3, mild renal insufficiency, lactic acidosis (2.3), procalcitonin 0.46. ABG PO2 47. CXR showed pulmonary edema. Pt fluid resuscitated in the ED. CT chest pending.     No past medical history on file.    No past surgical history on file.    Review of patient's allergies indicates:  No Known Allergies    No current facility-administered medications on file prior to encounter.      Current Outpatient Medications on File Prior to Encounter   Medication Sig    albuterol (PROAIR HFA) 90 mcg/actuation inhaler 2 puffs 4 (four) times daily.    albuterol-ipratropium (DUO-NEB) 2.5 mg-0.5 mg/3 mL nebulizer solution Inhale 3 mLs into the lungs every 6 (six) hours as needed.    ALPRAZolam (XANAX) 1 MG tablet Take 1 mg by mouth 2 (two) times daily as needed for Anxiety.     amLODIPine (NORVASC) 10 MG tablet Take 10 mg by mouth once daily.     budesonide-formoterol 160-4.5 mcg (SYMBICORT) 160-4.5 mcg/actuation HFAA Inhale 2 puffs into the lungs 2 (two) times daily.    DULoxetine (CYMBALTA) 60 MG capsule Take 60 mg by mouth once daily.    esomeprazole (NEXIUM) 40 MG capsule Take 40 mg by mouth once daily.    eszopiclone (LUNESTA) 3 mg Tab Take 3 mg by mouth nightly.    gabapentin (NEURONTIN) 800 MG tablet Take 800 mg by mouth 3 (three) times daily.    HYDROcodone-acetaminophen (NORCO)  mg per tablet Take 1 tablet by mouth every 4 (four) hours as needed.    montelukast (SINGULAIR) 10 mg tablet Take 10 mg by mouth once daily.    azelastine (ASTELIN) 137 mcg (0.1 %) nasal spray 2 sprays by Nasal route 2 (two) times daily.    cyclobenzaprine (FLEXERIL) 10 MG tablet Take 10 mg by mouth 3 (three) times daily as needed.    ipratropium (ATROVENT HFA) 17 mcg/actuation inhaler Inhale 2 puffs into the lungs 2 (two) times daily.    SYMBICORT 160-4.5 mcg/actuation HFAA 1 puff 2 (two) times daily.     Family History     Reviewed. Not pertinent        Tobacco Use    Smoking status: Not on file   Substance and Sexual Activity    Alcohol use: Not on file    Drug use: Not on file    Sexual activity: Not on file     Review of Systems   Constitutional: Positive for activity change, appetite change, chills and fever.   HENT: Negative for trouble swallowing.    Eyes: Negative for visual disturbance.   Respiratory: Positive for cough, shortness of breath and wheezing. Negative for apnea, choking, chest tightness and stridor.    Cardiovascular: Negative for chest pain, palpitations and leg swelling.   Gastrointestinal: Negative for abdominal pain, constipation, diarrhea, nausea and vomiting.   Genitourinary: Negative for decreased urine volume, difficulty urinating, dysuria, frequency and urgency.   Musculoskeletal: Negative for arthralgias, back pain, gait problem, joint swelling, myalgias, neck pain and neck stiffness.   Skin: Negative for color change.    Neurological: Positive for weakness. Negative for dizziness, tremors, seizures, syncope, facial asymmetry, speech difficulty, light-headedness, numbness and headaches.   Psychiatric/Behavioral: Negative for agitation and behavioral problems.     Objective:     Vital Signs (Most Recent):  Temp: 98.2 °F (36.8 °C) (02/27/19 1455)  Pulse: 91 (02/27/19 1701)  Resp: 16 (02/27/19 1701)  BP: (!) 112/51 (02/27/19 1701)  SpO2: 100 % (02/27/19 1701) Vital Signs (24h Range):  Temp:  [98.2 °F (36.8 °C)] 98.2 °F (36.8 °C)  Pulse:  [] 91  Resp:  [16-21] 16  SpO2:  [84 %-100 %] 100 %  BP: ()/(51-55) 112/51     Weight: 49.8 kg (109 lb 12.6 oz)  Body mass index is 21.44 kg/m².    Physical Exam   Constitutional: She is oriented to person, place, and time. She appears well-developed. She is cooperative. She is easily aroused. She has a sickly appearance. Nasal cannula in place.   HENT:   Head: Normocephalic and atraumatic.   Eyes: EOM are normal.   Neck: Normal range of motion. Neck supple.   Cardiovascular: Normal rate, regular rhythm, normal heart sounds and intact distal pulses.   No murmur heard.  Pulmonary/Chest: No accessory muscle usage. Tachypnea noted. She has wheezes in the right upper field, the right middle field, the right lower field, the left upper field and the left lower field.   Abdominal: Soft. Bowel sounds are normal. She exhibits no distension. There is no tenderness. There is no rebound and no guarding.   Genitourinary:   Genitourinary Comments: Deferred   Musculoskeletal: Normal range of motion. She exhibits no edema, tenderness or deformity.   Neurological: She is alert, oriented to person, place, and time and easily aroused. No sensory deficit.   Skin: Skin is warm and dry. Capillary refill takes less than 2 seconds.   Psychiatric: She has a normal mood and affect. Her behavior is normal. Judgment and thought content normal.   Nursing note and vitals reviewed.        CRANIAL NERVES     CN III, IV,  VI   Extraocular motions are normal.        Significant Labs:   ABGs:   Recent Labs   Lab 02/27/19  1538   PH 7.420   PCO2 48.4*   HCO3 31.4*   POCSATURATED 83*   BE 7     CBC:   Recent Labs   Lab 02/27/19  1521   WBC 7.69   HGB 13.9   HCT 43.3        CMP:   Recent Labs   Lab 02/27/19  1521      K 4.0   CL 94*   CO2 31*   *   BUN 12   CREATININE 1.1   CALCIUM 9.1   PROT 7.5   ALBUMIN 4.0   BILITOT 0.2   ALKPHOS 78   AST 16   ALT 8*   ANIONGAP 11   EGFRNONAA 52*     Lactic Acid:   Recent Labs   Lab 02/27/19  1521   LACTATE 2.3*     Magnesium:   Recent Labs   Lab 02/27/19  1521   MG 2.4     Troponin:   Recent Labs   Lab 02/27/19  1521   TROPONINI <0.006     Urine Studies:   Recent Labs   Lab 02/27/19  1638   COLORU Yellow   APPEARANCEUA Clear   PHUR 6.0   SPECGRAV <=1.005*   PROTEINUA Negative   GLUCUA Negative   KETONESU Negative   BILIRUBINUA Negative   OCCULTUA Trace*   NITRITE Negative   UROBILINOGEN Negative   LEUKOCYTESUR Negative       Significant Imaging:   Imaging Results          CT Chest Without Contrast (In process)                X-Ray Chest PA And Lateral (Final result)  Result time 02/27/19 16:58:09    Final result by BRADLEY Dow Sr., MD (02/27/19 16:58:09)                 Impression:      1. There has been interval development of a mild amount of interstitial opacities seen in both lungs with Kerley B-lines visualized bilaterally.  This is characteristic of pulmonary edema.  2. There are mild degenerative changes in the inferior aspect of the thoracic spine.  3. There is partial visualization of posterior spinal fusion hardware in the lumbar spine.  .      Electronically signed by: Emir Dow MD  Date:    02/27/2019  Time:    16:58             Narrative:    EXAMINATION:  XR CHEST PA AND LATERAL    CLINICAL HISTORY:  Pneumonia, unspecified organism    COMPARISON:  01/05/2010    FINDINGS:  The size and contour of the heart are normal.  There has been interval development of  a mild amount of interstitial opacities seen in both lungs with Kerley B-lines visualized bilaterally.  There is no pneumothorax or pleural effusion.  There are mild degenerative changes in the inferior aspect of the thoracic spine.  There is partial visualization of posterior spinal fusion hardware in the lumbar spine.                              Assessment/Plan:     * Acute respiratory failure with hypoxia    - PO2 47. Likely secondary to influenza and COPD exacerbation  - CXR at [facility] findings reported pneumonia but CXR here reporting pulmonary edema. CT chest pending   - Will treat underlying cause for now with IV antibiotics, PO antiviral, IV steroids, LABA/SAKSHI/ICS, supplemental oxygen       Severe sepsis    - Severe sepsis criteria:  HR > 90, RR > 20, lactic acid > 2, source lungs  - Pt mildly hypotensive upon arrival to ED, systolic 90s. She received 30 ml/kg fluid resuscitation  - (+) influenza at [facility] prior to arrival  - Repeat influenza pending  - Blood cultures obtained, results pending  - Continue IV Rocephin/Azithromycin for tx for possible pneumonia. CT chest pending  - Continue PO Tamiflu for tx of flu       Influenza    - (+) influenza at [facility] prior to arrival today  - Influenza pending  - Continue PO Tamiflu       Renal insufficiency    - Creatinine 1.1   - Mild renal insufficiency  - Likely secondary to severe sepsis  - Pt received 30 ml/kg fluid resuscitation  - Repeat BMP in AM       Essential hypertension    - Borderline hypotensive  - Hold Amlodipine at this time. Will continue to monitor BP and will restart once BP can tolerate       COPD exacerbation    - Pt is followed by Dr. Madelin Dos Santos (pulmonologist)  - IV steroids, ICS, LABA/SAKSHI, supplemental oxygen  - Last saw physician on 02/19/19 -- Per care everywhere -- performed 6-minute walk test and pulmonary function testing prior to clinic.   FEV1 is 27% consistent with very severe obstructive lung  disease. However she did not require supplemental oxygen on her 6-minute walk test. He informed her that she no longer needs to use oxygen with exertion or at rest. She does claim to feel better with using oxygen at nighttime and hence I told her that she could continue to use it if she derived subjective benefit from it  - Obtain home oxygen evaluation prior to discharge if unable to wean         VTE Risk Mitigation (From admission, onward)        Ordered     enoxaparin injection 40 mg  Daily      02/27/19 1847     IP VTE LOW RISK PATIENT  Once      02/27/19 1804     Place ROME hose  Until discontinued      02/27/19 1804             AUSTIN Price  Department of Hospital Medicine   [facility]

## 2019-02-28 NOTE — ASSESSMENT & PLAN NOTE
- Severe sepsis criteria:  HR > 90, RR > 20, lactic acid > 2, source lungs  - Pt mildly hypotensive upon arrival to ED, systolic 90s. She received 30 ml/kg fluid resuscitation  - (+) influenza at [facility] prior to arrival  - Repeat influenza pending  - Blood cultures obtained, results pending  - Continue IV Rocephin/Azithromycin for tx for possible pneumonia. CT chest pending  - Continue PO Tamiflu for tx of flu

## 2019-02-28 NOTE — ASSESSMENT & PLAN NOTE
- PO2 47. Likely secondary to influenza and COPD exacerbation  - CXR at [facility] findings reported pneumonia but CXR here reporting pulmonary edema. CT chest pending   - Will treat underlying cause for now with IV antibiotics, PO antiviral, IV steroids, LABA/SAKSHI/ICS, supplemental oxygen

## 2019-02-28 NOTE — ASSESSMENT & PLAN NOTE
- Creatinine 1.1   - Mild renal insufficiency  - Likely secondary to severe sepsis  - Pt received 30 ml/kg fluid resuscitation  - Repeat BMP in AM

## 2019-02-28 NOTE — ASSESSMENT & PLAN NOTE
- Blood cultures show NGTD  - Continue IV Rocephin/Azithromycin   - Continue PO Tamiflu for tx of flu  Cont iVFs  Sputum culture pending

## 2019-02-28 NOTE — ASSESSMENT & PLAN NOTE
- Pt is followed by Dr. Madelin Dos Santos (pulmonologist)  - IV steroids, ICS, LABA/SAKSHI, supplemental oxygen  Pt reports she has home oxygen and portable tanks- but was told she did not need it

## 2019-02-28 NOTE — ASSESSMENT & PLAN NOTE
- PO2 47. Likely secondary to influenz, PNA,  and COPD exacerbation  Will treat underlying cause for now with IV antibiotics, PO antiviral, IV steroids, LABA/SAKSHI/ICS, supplemental oxygen

## 2019-02-28 NOTE — SUBJECTIVE & OBJECTIVE
No past medical history on file.    No past surgical history on file.    Review of patient's allergies indicates:  No Known Allergies    No current facility-administered medications on file prior to encounter.      Current Outpatient Medications on File Prior to Encounter   Medication Sig    albuterol (PROAIR HFA) 90 mcg/actuation inhaler 2 puffs 4 (four) times daily.    albuterol-ipratropium (DUO-NEB) 2.5 mg-0.5 mg/3 mL nebulizer solution Inhale 3 mLs into the lungs every 6 (six) hours as needed.    ALPRAZolam (XANAX) 1 MG tablet Take 1 mg by mouth 2 (two) times daily as needed for Anxiety.     amLODIPine (NORVASC) 10 MG tablet Take 10 mg by mouth once daily.    budesonide-formoterol 160-4.5 mcg (SYMBICORT) 160-4.5 mcg/actuation HFAA Inhale 2 puffs into the lungs 2 (two) times daily.    DULoxetine (CYMBALTA) 60 MG capsule Take 60 mg by mouth once daily.    esomeprazole (NEXIUM) 40 MG capsule Take 40 mg by mouth once daily.    eszopiclone (LUNESTA) 3 mg Tab Take 3 mg by mouth nightly.    gabapentin (NEURONTIN) 800 MG tablet Take 800 mg by mouth 3 (three) times daily.    HYDROcodone-acetaminophen (NORCO)  mg per tablet Take 1 tablet by mouth every 4 (four) hours as needed.    montelukast (SINGULAIR) 10 mg tablet Take 10 mg by mouth once daily.    azelastine (ASTELIN) 137 mcg (0.1 %) nasal spray 2 sprays by Nasal route 2 (two) times daily.    cyclobenzaprine (FLEXERIL) 10 MG tablet Take 10 mg by mouth 3 (three) times daily as needed.    ipratropium (ATROVENT HFA) 17 mcg/actuation inhaler Inhale 2 puffs into the lungs 2 (two) times daily.    SYMBICORT 160-4.5 mcg/actuation HFAA 1 puff 2 (two) times daily.     Family History     Reviewed. Not pertinent        Tobacco Use    Smoking status: Not on file   Substance and Sexual Activity    Alcohol use: Not on file    Drug use: Not on file    Sexual activity: Not on file     Review of Systems   Constitutional: Positive for activity change, appetite  change, chills and fever.   HENT: Negative for trouble swallowing.    Eyes: Negative for visual disturbance.   Respiratory: Positive for cough, shortness of breath and wheezing. Negative for apnea, choking, chest tightness and stridor.    Cardiovascular: Negative for chest pain, palpitations and leg swelling.   Gastrointestinal: Negative for abdominal pain, constipation, diarrhea, nausea and vomiting.   Genitourinary: Negative for decreased urine volume, difficulty urinating, dysuria, frequency and urgency.   Musculoskeletal: Negative for arthralgias, back pain, gait problem, joint swelling, myalgias, neck pain and neck stiffness.   Skin: Negative for color change.   Neurological: Positive for weakness. Negative for dizziness, tremors, seizures, syncope, facial asymmetry, speech difficulty, light-headedness, numbness and headaches.   Psychiatric/Behavioral: Negative for agitation and behavioral problems.     Objective:     Vital Signs (Most Recent):  Temp: 98.2 °F (36.8 °C) (02/27/19 1455)  Pulse: 91 (02/27/19 1701)  Resp: 16 (02/27/19 1701)  BP: (!) 112/51 (02/27/19 1701)  SpO2: 100 % (02/27/19 1701) Vital Signs (24h Range):  Temp:  [98.2 °F (36.8 °C)] 98.2 °F (36.8 °C)  Pulse:  [] 91  Resp:  [16-21] 16  SpO2:  [84 %-100 %] 100 %  BP: ()/(51-55) 112/51     Weight: 49.8 kg (109 lb 12.6 oz)  Body mass index is 21.44 kg/m².    Physical Exam   Constitutional: She is oriented to person, place, and time. She appears well-developed. She is cooperative. She is easily aroused. She has a sickly appearance. Nasal cannula in place.   HENT:   Head: Normocephalic and atraumatic.   Eyes: EOM are normal.   Neck: Normal range of motion. Neck supple.   Cardiovascular: Normal rate, regular rhythm, normal heart sounds and intact distal pulses.   No murmur heard.  Pulmonary/Chest: No accessory muscle usage. Tachypnea noted. She has wheezes in the right upper field, the right middle field, the right lower field, the left  upper field and the left lower field.   Abdominal: Soft. Bowel sounds are normal. She exhibits no distension. There is no tenderness. There is no rebound and no guarding.   Genitourinary:   Genitourinary Comments: Deferred   Musculoskeletal: Normal range of motion. She exhibits no edema, tenderness or deformity.   Neurological: She is alert, oriented to person, place, and time and easily aroused. No sensory deficit.   Skin: Skin is warm and dry. Capillary refill takes less than 2 seconds.   Psychiatric: She has a normal mood and affect. Her behavior is normal. Judgment and thought content normal.   Nursing note and vitals reviewed.        CRANIAL NERVES     CN III, IV, VI   Extraocular motions are normal.        Significant Labs:   ABGs:   Recent Labs   Lab 02/27/19  1538   PH 7.420   PCO2 48.4*   HCO3 31.4*   POCSATURATED 83*   BE 7     CBC:   Recent Labs   Lab 02/27/19  1521   WBC 7.69   HGB 13.9   HCT 43.3        CMP:   Recent Labs   Lab 02/27/19  1521      K 4.0   CL 94*   CO2 31*   *   BUN 12   CREATININE 1.1   CALCIUM 9.1   PROT 7.5   ALBUMIN 4.0   BILITOT 0.2   ALKPHOS 78   AST 16   ALT 8*   ANIONGAP 11   EGFRNONAA 52*     Lactic Acid:   Recent Labs   Lab 02/27/19  1521   LACTATE 2.3*     Magnesium:   Recent Labs   Lab 02/27/19  1521   MG 2.4     Troponin:   Recent Labs   Lab 02/27/19  1521   TROPONINI <0.006     Urine Studies:   Recent Labs   Lab 02/27/19  1638   COLORU Yellow   APPEARANCEUA Clear   PHUR 6.0   SPECGRAV <=1.005*   PROTEINUA Negative   GLUCUA Negative   KETONESU Negative   BILIRUBINUA Negative   OCCULTUA Trace*   NITRITE Negative   UROBILINOGEN Negative   LEUKOCYTESUR Negative       Significant Imaging:   Imaging Results          CT Chest Without Contrast (In process)                X-Ray Chest PA And Lateral (Final result)  Result time 02/27/19 16:58:09    Final result by BRADLEY Dow Sr., MD (02/27/19 16:58:09)                 Impression:      1. There has been  interval development of a mild amount of interstitial opacities seen in both lungs with Kerley B-lines visualized bilaterally.  This is characteristic of pulmonary edema.  2. There are mild degenerative changes in the inferior aspect of the thoracic spine.  3. There is partial visualization of posterior spinal fusion hardware in the lumbar spine.  .      Electronically signed by: Emir Dow MD  Date:    02/27/2019  Time:    16:58             Narrative:    EXAMINATION:  XR CHEST PA AND LATERAL    CLINICAL HISTORY:  Pneumonia, unspecified organism    COMPARISON:  01/05/2010    FINDINGS:  The size and contour of the heart are normal.  There has been interval development of a mild amount of interstitial opacities seen in both lungs with Kerley B-lines visualized bilaterally.  There is no pneumothorax or pleural effusion.  There are mild degenerative changes in the inferior aspect of the thoracic spine.  There is partial visualization of posterior spinal fusion hardware in the lumbar spine.

## 2019-02-28 NOTE — PLAN OF CARE
02/28/19 1146   LOVE Message   Medicare Outpatient and Observation Notification regarding financial responsibility Given to patient/caregiver;Explained to patient/caregiver;Signed/date by patient/caregiver   Date LOVE was signed 02/28/19   Time LOVE was signed 1134

## 2019-02-28 NOTE — PROGRESS NOTES
[facility] Hospital Medicine  Progress Note    Patient Name: Jill Minaya  MRN: [id number]  Patient Class: IP- Inpatient   Admission Date: 2/27/2019  Length of Stay: 0 days  Attending Physician: Melisa Reagan MD  Primary Care Provider: Ruth Ann Zhu MD        Subjective:     Principal Problem:Acute respiratory failure with hypoxia    HPI:  Jill Minaya is a 68 year old female with a PMHx of HTN, Asthma, COPD, Chronic back pain, and former tobacco user (quit 2 weeks ago) who presented to the Emergency Department with c/o SOB. Associated symptoms include: subjective fever (103.6), chills, productive cough with yellow sputum, and body aches. Pt reports symptoms started yesterday. Went to [facility] today and diagnosed with Flu and pneumonia. Pt was sent to ED due to hypoxia. ED workup showed:  Normal WBC count, Hgb/Hct 13.9/43.3, mild renal insufficiency, lactic acidosis (2.3), procalcitonin 0.46. ABG PO2 47. CXR showed pulmonary edema. Pt fluid resuscitated in the ED. CT chest pending.     Hospital Course:  The pt is a 69 yo female with HTN, Asthma, severe COPD, Chronic back pain, and tobacco use who was placed in observation with Severe sepsis and bilateral pneumonia, Acute respiratory failure with hypoxia, COPD exacerbation, and Influenza A on IV Rocephin, IV Azithromycin, IVFs, IV steroids, and Tamiflu.   The pt reports symptoms have not improved. She remains SOB above baseline and loose cough.  Pt reports she has home oxygen but was not wearing it for several months- she reports she was told she does not need it.   Afebrile, WBC normal         Interval History: +SOB above baseline, loose cough, wheezing, fatigue     Review of Systems   Constitutional: Positive for activity change and fatigue. Negative for appetite change, chills, diaphoresis, fever and unexpected weight change.   HENT: Negative for congestion, nosebleeds, sinus pressure and sore throat.    Eyes: Negative for  pain, discharge and visual disturbance.   Respiratory: Positive for cough and shortness of breath. Negative for chest tightness, wheezing and stridor.    Cardiovascular: Negative for chest pain, palpitations and leg swelling.   Gastrointestinal: Negative for abdominal distention, abdominal pain, blood in stool, constipation, diarrhea, nausea and vomiting.   Endocrine: Negative for cold intolerance and heat intolerance.   Genitourinary: Negative for difficulty urinating, dysuria, flank pain, frequency and urgency.   Musculoskeletal: Negative for arthralgias, back pain, joint swelling, myalgias, neck pain and neck stiffness.   Skin: Negative for rash and wound.   Allergic/Immunologic: Negative for food allergies and immunocompromised state.   Neurological: Positive for weakness. Negative for dizziness, seizures, syncope, facial asymmetry, speech difficulty, light-headedness, numbness and headaches.   Hematological: Negative for adenopathy.   Psychiatric/Behavioral: Negative for agitation, confusion and hallucinations.     Objective:     Vital Signs (Most Recent):  Temp: 98.4 °F (36.9 °C) (02/28/19 1149)  Pulse: 110 (02/28/19 1321)  Resp: 17 (02/28/19 1229)  BP: 134/67 (02/28/19 1149)  SpO2: 95 % (02/28/19 1229) Vital Signs (24h Range):  Temp:  [98.2 °F (36.8 °C)-98.8 °F (37.1 °C)] 98.4 °F (36.9 °C)  Pulse:  [] 110  Resp:  [16-21] 17  SpO2:  [84 %-100 %] 95 %  BP: ()/(51-70) 134/67     Weight: 49.8 kg (109 lb 12.6 oz)  Body mass index is 21.44 kg/m².    Intake/Output Summary (Last 24 hours) at 2/28/2019 1340  Last data filed at 2/28/2019 1109  Gross per 24 hour   Intake 3601.08 ml   Output --   Net 3601.08 ml      Physical Exam   Constitutional: She is oriented to person, place, and time. No distress.   Sickly appearance    HENT:   Head: Normocephalic and atraumatic.   Nose: Nose normal.   Mouth/Throat: Oropharynx is clear and moist.   Eyes: Conjunctivae and EOM are normal. No scleral icterus.   Neck:  Normal range of motion. Neck supple. No tracheal deviation present.   Cardiovascular: Regular rhythm, normal heart sounds and intact distal pulses. Exam reveals no gallop and no friction rub.   No murmur heard.  Tachycardia    Pulmonary/Chest: Effort normal. No stridor. No respiratory distress. She has wheezes. She has no rales. She exhibits no tenderness.   + conversational dyspnea  Wheeze and rhonchi bilaterally    Abdominal: Soft. Bowel sounds are normal. She exhibits no distension and no mass. There is no tenderness. There is no rebound and no guarding.   Musculoskeletal: Normal range of motion. She exhibits no edema, tenderness or deformity.   Neurological: She is alert and oriented to person, place, and time. No cranial nerve deficit. She exhibits normal muscle tone. Coordination normal.   Skin: Skin is warm and dry. No rash noted. She is not diaphoretic. No erythema. No pallor.   Psychiatric: She has a normal mood and affect. Her behavior is normal. Thought content normal.   Nursing note and vitals reviewed.      Significant Labs:   BMP:   Recent Labs   Lab 02/27/19  1521 02/28/19  0459   * 123*    139   K 4.0 3.8   CL 94* 101   CO2 31* 31*   BUN 12 9   CREATININE 1.1 0.6   CALCIUM 9.1 8.5*   MG 2.4  --      CBC:   Recent Labs   Lab 02/27/19  1521 02/28/19  0459   WBC 7.69 3.88*   HGB 13.9 12.2   HCT 43.3 38.4    187     CMP:   Recent Labs   Lab 02/27/19  1521 02/28/19  0459    139   K 4.0 3.8   CL 94* 101   CO2 31* 31*   * 123*   BUN 12 9   CREATININE 1.1 0.6   CALCIUM 9.1 8.5*   PROT 7.5 6.0   ALBUMIN 4.0 3.2*   BILITOT 0.2 0.2   ALKPHOS 78 58   AST 16 13   ALT 8* 8*   ANIONGAP 11 7*   EGFRNONAA 52* >60     All pertinent labs within the past 24 hours have been reviewed.    Significant Imaging:   Imaging Results          CT Chest Without Contrast (Final result)  Result time 02/27/19 18:59:11    Final result by Luis E Espino MD (02/27/19 18:59:11)                 Impression:       Diffuse tree-in-bud micro nodule type opacification. Findings suspicious for endobronchial spread of disease pneumonia including aspiration, typical and atypical organisms, and mycobacterial disease including tuberculosis. At lung bases, there is suggestion of some subpleural sparing raising question of underlying interstitial process such as NSIP.  Diffuse moderate bronchial wall thickening suggesting airway inflammation.  Moderate severity upper lobe predominant centrilobular emphysema.    There is a left posterior lower lobe 1.2 x 0.8 x 0.4 cm focus of opacification, possibly nodule. Recommend follow-up chest CT in 3 months to demonstrate stability. Alternatively PET-CT could be performed.      Electronically signed by: Luis E Espino  Date:    02/27/2019  Time:    18:59             Narrative:    EXAMINATION:  CT CHEST WITHOUT CONTRAST    CLINICAL HISTORY:  Shortness of breath;Sepsis;    TECHNIQUE:  Low dose axial images, sagittal and coronal reformations were obtained from the thoracic inlet to the lung bases. Contrast was not administered.  All CT scans at this location are performed using dose modulation techniques as appropriate to a performed exam including the following: Automated exposure control; adjustment of the mA and/or kV  according to patient size.    COMPARISON:  02/27/2019    FINDINGS:  Base of Neck: No significant abnormality.    Thoracic soft tissues: Normal.    Aorta: Left-sided aortic arch.  No aneurysm.  Advanced coronary and aortic atherosclerotic calcification.    Heart: Normal size. No effusion.    Pulmonary vasculature: Pulmonary arteries distribute normally.    Pretty/Mediastinum: No pathologic enoch enlargement.  Reactive appearing mediastinal lymph nodes.    Airways: Patent.    Lungs/Pleura: Diffuse tree-in-bud micro nodule type opacification.  Findings suspicious for endobronchial spread of disease pneumonia including aspiration, typical and atypical organisms, and mycobacterial  disease including tuberculosis.   At lung bases, there is suggestion of some subpleural sparing raising question of underlying interstitial process.  Diffuse moderate bronchial wall thickening suggesting airway inflammation.  Moderate severity upper lobe predominant centrilobular emphysema.  There is a left posterior lower lobe 1.2 x 0.8 x 0.4 cm focus of opacification, possibly nodule.  Recommend follow-up chest CT in  3 months to demonstrate stability.  Alternatively PET-CT could be performed.    Esophagus: Normal.    Upper Abdomen: No abnormality of the partially imaged upper abdomen.    Bones: No acute fracture. No suspicious lytic or sclerotic lesions.  Incompletely visualized lumbar hardware.                               X-Ray Chest PA And Lateral (Final result)  Result time 02/27/19 16:58:09    Final result by BRADLEY Dow Sr., MD (02/27/19 16:58:09)                 Impression:      1. There has been interval development of a mild amount of interstitial opacities seen in both lungs with Kerley B-lines visualized bilaterally.  This is characteristic of pulmonary edema.  2. There are mild degenerative changes in the inferior aspect of the thoracic spine.  3. There is partial visualization of posterior spinal fusion hardware in the lumbar spine.  .      Electronically signed by: Emir Dow MD  Date:    02/27/2019  Time:    16:58             Narrative:    EXAMINATION:  XR CHEST PA AND LATERAL    CLINICAL HISTORY:  Pneumonia, unspecified organism    COMPARISON:  01/05/2010    FINDINGS:  The size and contour of the heart are normal.  There has been interval development of a mild amount of interstitial opacities seen in both lungs with Kerley B-lines visualized bilaterally.  There is no pneumothorax or pleural effusion.  There are mild degenerative changes in the inferior aspect of the thoracic spine.  There is partial visualization of posterior spinal fusion hardware in the lumbar spine.                               Assessment/Plan:      * Acute respiratory failure with hypoxia    - PO2 47. Likely secondary to influenz, PNA,  and COPD exacerbation  Will treat underlying cause for now with IV antibiotics, PO antiviral, IV steroids, LABA/SAKSHI/ICS, supplemental oxygen       Severe sepsis due to bilateral pneumonia     - Blood cultures show NGTD  - Continue IV Rocephin/Azithromycin   - Continue PO Tamiflu for tx of flu  Cont iVFs  Sputum culture pending        COPD exacerbation    - Pt is followed by Dr. Madelin Dos Santos (pulmonologist)  - IV steroids, ICS, LABA/SAKSHI, supplemental oxygen  Pt reports she has home oxygen and portable tanks- but was told she did not need it        Influenza    - (+) influenza at [facility] prior to arrival today  - Influenza pending  - Continue PO Tamiflu       Renal insufficiency    Improved after IV hydration   -- Repeat BMP in AM       Essential hypertension    - Borderline hypotensive  - Hold Amlodipine at this time. Will continue to monitor BP and will restart once BP can tolerate         VTE Risk Mitigation (From admission, onward)        Ordered     enoxaparin injection 40 mg  Daily      02/27/19 1847     IP VTE LOW RISK PATIENT  Once      02/27/19 1804     Place ROME hose  Until discontinued      02/27/19 1804              Josefina Whitfield NP  Department of Hospital Medicine   [facility]

## 2019-02-28 NOTE — PLAN OF CARE
Problem: Infection  Goal: Infection Symptom Resolution  Outcome: Ongoing (interventions implemented as appropriate)  IV antibiotics    Comments: Patient A&Ox4. 3L of O2 but patient states she wears home O2. Sputum culture sent. Nebs scheduled. Nicotine patch placed. IV fluids infusing. New IV placed to . IV antibiotics administered. PRN pain medication given. No questions at this time.     Manju Lindsey RN

## 2019-02-28 NOTE — PLAN OF CARE
Problem: Adult Inpatient Plan of Care  Goal: Plan of Care Review  Outcome: Ongoing (interventions implemented as appropriate)  The patient has been sinus rhythm on the monitor. NS infusing at 100 mL/hr. Pt has had an uneventful night and is resting quietly, will continue to monitor.

## 2019-02-28 NOTE — ED NOTES
Attempted to call report to bring pt upstairs once bed assigned. RN on floor stated that this patient was assigned to a night nurse and that they can't take report until after 1900. RUSTAM

## 2019-02-28 NOTE — SUBJECTIVE & OBJECTIVE
Interval History: +SOB above baseline, loose cough, wheezing, fatigue     Review of Systems   Constitutional: Positive for activity change and fatigue. Negative for appetite change, chills, diaphoresis, fever and unexpected weight change.   HENT: Negative for congestion, nosebleeds, sinus pressure and sore throat.    Eyes: Negative for pain, discharge and visual disturbance.   Respiratory: Positive for cough and shortness of breath. Negative for chest tightness, wheezing and stridor.    Cardiovascular: Negative for chest pain, palpitations and leg swelling.   Gastrointestinal: Negative for abdominal distention, abdominal pain, blood in stool, constipation, diarrhea, nausea and vomiting.   Endocrine: Negative for cold intolerance and heat intolerance.   Genitourinary: Negative for difficulty urinating, dysuria, flank pain, frequency and urgency.   Musculoskeletal: Negative for arthralgias, back pain, joint swelling, myalgias, neck pain and neck stiffness.   Skin: Negative for rash and wound.   Allergic/Immunologic: Negative for food allergies and immunocompromised state.   Neurological: Positive for weakness. Negative for dizziness, seizures, syncope, facial asymmetry, speech difficulty, light-headedness, numbness and headaches.   Hematological: Negative for adenopathy.   Psychiatric/Behavioral: Negative for agitation, confusion and hallucinations.     Objective:     Vital Signs (Most Recent):  Temp: 98.4 °F (36.9 °C) (02/28/19 1149)  Pulse: 110 (02/28/19 1321)  Resp: 17 (02/28/19 1229)  BP: 134/67 (02/28/19 1149)  SpO2: 95 % (02/28/19 1229) Vital Signs (24h Range):  Temp:  [98.2 °F (36.8 °C)-98.8 °F (37.1 °C)] 98.4 °F (36.9 °C)  Pulse:  [] 110  Resp:  [16-21] 17  SpO2:  [84 %-100 %] 95 %  BP: ()/(51-70) 134/67     Weight: 49.8 kg (109 lb 12.6 oz)  Body mass index is 21.44 kg/m².    Intake/Output Summary (Last 24 hours) at 2/28/2019 1340  Last data filed at 2/28/2019 1109  Gross per 24 hour   Intake  3601.08 ml   Output --   Net 3601.08 ml      Physical Exam   Constitutional: She is oriented to person, place, and time. No distress.   Sickly appearance    HENT:   Head: Normocephalic and atraumatic.   Nose: Nose normal.   Mouth/Throat: Oropharynx is clear and moist.   Eyes: Conjunctivae and EOM are normal. No scleral icterus.   Neck: Normal range of motion. Neck supple. No tracheal deviation present.   Cardiovascular: Regular rhythm, normal heart sounds and intact distal pulses. Exam reveals no gallop and no friction rub.   No murmur heard.  Tachycardia    Pulmonary/Chest: Effort normal. No stridor. No respiratory distress. She has wheezes. She has no rales. She exhibits no tenderness.   + conversational dyspnea  Wheeze and rhonchi bilaterally    Abdominal: Soft. Bowel sounds are normal. She exhibits no distension and no mass. There is no tenderness. There is no rebound and no guarding.   Musculoskeletal: Normal range of motion. She exhibits no edema, tenderness or deformity.   Neurological: She is alert and oriented to person, place, and time. No cranial nerve deficit. She exhibits normal muscle tone. Coordination normal.   Skin: Skin is warm and dry. No rash noted. She is not diaphoretic. No erythema. No pallor.   Psychiatric: She has a normal mood and affect. Her behavior is normal. Thought content normal.   Nursing note and vitals reviewed.      Significant Labs:   BMP:   Recent Labs   Lab 02/27/19  1521 02/28/19  0459   * 123*    139   K 4.0 3.8   CL 94* 101   CO2 31* 31*   BUN 12 9   CREATININE 1.1 0.6   CALCIUM 9.1 8.5*   MG 2.4  --      CBC:   Recent Labs   Lab 02/27/19  1521 02/28/19  0459   WBC 7.69 3.88*   HGB 13.9 12.2   HCT 43.3 38.4    187     CMP:   Recent Labs   Lab 02/27/19  1521 02/28/19  0459    139   K 4.0 3.8   CL 94* 101   CO2 31* 31*   * 123*   BUN 12 9   CREATININE 1.1 0.6   CALCIUM 9.1 8.5*   PROT 7.5 6.0   ALBUMIN 4.0 3.2*   BILITOT 0.2 0.2   ALKPHOS 78 58    AST 16 13   ALT 8* 8*   ANIONGAP 11 7*   EGFRNONAA 52* >60     All pertinent labs within the past 24 hours have been reviewed.    Significant Imaging:   Imaging Results          CT Chest Without Contrast (Final result)  Result time 02/27/19 18:59:11    Final result by Luis E Espino MD (02/27/19 18:59:11)                 Impression:      Diffuse tree-in-bud micro nodule type opacification. Findings suspicious for endobronchial spread of disease pneumonia including aspiration, typical and atypical organisms, and mycobacterial disease including tuberculosis. At lung bases, there is suggestion of some subpleural sparing raising question of underlying interstitial process such as NSIP.  Diffuse moderate bronchial wall thickening suggesting airway inflammation.  Moderate severity upper lobe predominant centrilobular emphysema.    There is a left posterior lower lobe 1.2 x 0.8 x 0.4 cm focus of opacification, possibly nodule. Recommend follow-up chest CT in 3 months to demonstrate stability. Alternatively PET-CT could be performed.      Electronically signed by: Luis E Espino  Date:    02/27/2019  Time:    18:59             Narrative:    EXAMINATION:  CT CHEST WITHOUT CONTRAST    CLINICAL HISTORY:  Shortness of breath;Sepsis;    TECHNIQUE:  Low dose axial images, sagittal and coronal reformations were obtained from the thoracic inlet to the lung bases. Contrast was not administered.  All CT scans at this location are performed using dose modulation techniques as appropriate to a performed exam including the following: Automated exposure control; adjustment of the mA and/or kV  according to patient size.    COMPARISON:  02/27/2019    FINDINGS:  Base of Neck: No significant abnormality.    Thoracic soft tissues: Normal.    Aorta: Left-sided aortic arch.  No aneurysm.  Advanced coronary and aortic atherosclerotic calcification.    Heart: Normal size. No effusion.    Pulmonary vasculature: Pulmonary arteries distribute  normally.    Pretty/Mediastinum: No pathologic enoch enlargement.  Reactive appearing mediastinal lymph nodes.    Airways: Patent.    Lungs/Pleura: Diffuse tree-in-bud micro nodule type opacification.  Findings suspicious for endobronchial spread of disease pneumonia including aspiration, typical and atypical organisms, and mycobacterial disease including tuberculosis.   At lung bases, there is suggestion of some subpleural sparing raising question of underlying interstitial process.  Diffuse moderate bronchial wall thickening suggesting airway inflammation.  Moderate severity upper lobe predominant centrilobular emphysema.  There is a left posterior lower lobe 1.2 x 0.8 x 0.4 cm focus of opacification, possibly nodule.  Recommend follow-up chest CT in  3 months to demonstrate stability.  Alternatively PET-CT could be performed.    Esophagus: Normal.    Upper Abdomen: No abnormality of the partially imaged upper abdomen.    Bones: No acute fracture. No suspicious lytic or sclerotic lesions.  Incompletely visualized lumbar hardware.                               X-Ray Chest PA And Lateral (Final result)  Result time 02/27/19 16:58:09    Final result by BRADLEY Dow Sr., MD (02/27/19 16:58:09)                 Impression:      1. There has been interval development of a mild amount of interstitial opacities seen in both lungs with Kerley B-lines visualized bilaterally.  This is characteristic of pulmonary edema.  2. There are mild degenerative changes in the inferior aspect of the thoracic spine.  3. There is partial visualization of posterior spinal fusion hardware in the lumbar spine.  .      Electronically signed by: Emir Dow MD  Date:    02/27/2019  Time:    16:58             Narrative:    EXAMINATION:  XR CHEST PA AND LATERAL    CLINICAL HISTORY:  Pneumonia, unspecified organism    COMPARISON:  01/05/2010    FINDINGS:  The size and contour of the heart are normal.  There has been interval development of a  mild amount of interstitial opacities seen in both lungs with Kerley B-lines visualized bilaterally.  There is no pneumothorax or pleural effusion.  There are mild degenerative changes in the inferior aspect of the thoracic spine.  There is partial visualization of posterior spinal fusion hardware in the lumbar spine.

## 2019-02-28 NOTE — HOSPITAL COURSE
The pt is a 69 yo female with HTN, Asthma, severe COPD, Chronic back pain, and tobacco use who was placed in observation with Severe sepsis and bilateral pneumonia, Acute respiratory failure with hypoxia, COPD exacerbation, and Influenza A on IV Rocephin, IV Azithromycin, IVFs, IV steroids, and Tamiflu.   The pt reports symptoms have not improved. She remains SOB above baseline and loose cough.  Pt reports she has home oxygen but was not wearing it for several months- she reports she was told she does not need it.   Afebrile, WBC normal     Looks much better, wants to go home, breathing is easier, she quit smoking about 2-3 weeks ago. She was switched to oral Prednisone and Levaquin which she tolerated well and was deemed stable for discharge home on 3/2/19.

## 2019-02-28 NOTE — HPI
Jill Minaya is a 68 year old female with a PMHx of HTN, Asthma, COPD, Chronic back pain, and former tobacco user (quit 2 weeks ago) who presented to the Emergency Department with c/o SOB. Associated symptoms include: subjective fever (103.6), chills, productive cough with yellow sputum, and body aches. Pt reports symptoms started yesterday. Went to [facility] today and diagnosed with Flu and pneumonia. Pt was sent to ED due to hypoxia. ED workup showed:  Normal WBC count, Hgb/Hct 13.9/43.3, mild renal insufficiency, lactic acidosis (2.3), procalcitonin 0.46. ABG PO2 47. CXR showed pulmonary edema. Pt fluid resuscitated in the ED. CT chest pending.

## 2019-03-01 LAB
ALBUMIN SERPL BCP-MCNC: 3.1 G/DL
ALP SERPL-CCNC: 50 U/L
ALT SERPL W/O P-5'-P-CCNC: <5 U/L
ANION GAP SERPL CALC-SCNC: 10 MMOL/L
AST SERPL-CCNC: 11 U/L
BASOPHILS # BLD AUTO: 0 K/UL
BASOPHILS NFR BLD: 0 %
BILIRUB SERPL-MCNC: 0.2 MG/DL
BUN SERPL-MCNC: 9 MG/DL
CALCIUM SERPL-MCNC: 8.8 MG/DL
CHLORIDE SERPL-SCNC: 100 MMOL/L
CO2 SERPL-SCNC: 32 MMOL/L
CREAT SERPL-MCNC: 0.6 MG/DL
DIFFERENTIAL METHOD: ABNORMAL
EOSINOPHIL # BLD AUTO: 0 K/UL
EOSINOPHIL NFR BLD: 0 %
ERYTHROCYTE [DISTWIDTH] IN BLOOD BY AUTOMATED COUNT: 13.7 %
EST. GFR  (AFRICAN AMERICAN): >60 ML/MIN/1.73 M^2
EST. GFR  (NON AFRICAN AMERICAN): >60 ML/MIN/1.73 M^2
GLUCOSE SERPL-MCNC: 126 MG/DL
HCT VFR BLD AUTO: 38.6 %
HGB BLD-MCNC: 12.4 G/DL
LYMPHOCYTES # BLD AUTO: 0.6 K/UL
LYMPHOCYTES NFR BLD: 9.2 %
MCH RBC QN AUTO: 31.1 PG
MCHC RBC AUTO-ENTMCNC: 32.1 G/DL
MCV RBC AUTO: 97 FL
MONOCYTES # BLD AUTO: 0.6 K/UL
MONOCYTES NFR BLD: 9.3 %
NEUTROPHILS # BLD AUTO: 5.4 K/UL
NEUTROPHILS NFR BLD: 81.5 %
PLATELET # BLD AUTO: 190 K/UL
PMV BLD AUTO: 9.6 FL
POTASSIUM SERPL-SCNC: 3.6 MMOL/L
PROT SERPL-MCNC: 6 G/DL
RBC # BLD AUTO: 3.99 M/UL
SODIUM SERPL-SCNC: 142 MMOL/L
WBC # BLD AUTO: 6.66 K/UL

## 2019-03-01 PROCEDURE — 25000003 PHARM REV CODE 250: Performed by: NURSE PRACTITIONER

## 2019-03-01 PROCEDURE — 94640 AIRWAY INHALATION TREATMENT: CPT

## 2019-03-01 PROCEDURE — 25000242 PHARM REV CODE 250 ALT 637 W/ HCPCS: Performed by: NURSE PRACTITIONER

## 2019-03-01 PROCEDURE — 25000003 PHARM REV CODE 250: Performed by: EMERGENCY MEDICINE

## 2019-03-01 PROCEDURE — 85025 COMPLETE CBC W/AUTO DIFF WBC: CPT

## 2019-03-01 PROCEDURE — S4991 NICOTINE PATCH NONLEGEND: HCPCS | Performed by: NURSE PRACTITIONER

## 2019-03-01 PROCEDURE — 63600175 PHARM REV CODE 636 W HCPCS: Performed by: NURSE PRACTITIONER

## 2019-03-01 PROCEDURE — 80053 COMPREHEN METABOLIC PANEL: CPT

## 2019-03-01 PROCEDURE — 94761 N-INVAS EAR/PLS OXIMETRY MLT: CPT

## 2019-03-01 PROCEDURE — 21400001 HC TELEMETRY ROOM

## 2019-03-01 PROCEDURE — 63600175 PHARM REV CODE 636 W HCPCS: Performed by: EMERGENCY MEDICINE

## 2019-03-01 PROCEDURE — 27000221 HC OXYGEN, UP TO 24 HOURS

## 2019-03-01 PROCEDURE — 36415 COLL VENOUS BLD VENIPUNCTURE: CPT

## 2019-03-01 RX ORDER — GUAIFENESIN 600 MG/1
600 TABLET, EXTENDED RELEASE ORAL 2 TIMES DAILY
Status: DISCONTINUED | OUTPATIENT
Start: 2019-03-01 | End: 2019-03-02 | Stop reason: HOSPADM

## 2019-03-01 RX ORDER — FUROSEMIDE 20 MG/1
20 TABLET ORAL ONCE
Status: COMPLETED | OUTPATIENT
Start: 2019-03-01 | End: 2019-03-01

## 2019-03-01 RX ORDER — OSELTAMIVIR PHOSPHATE 75 MG/1
75 CAPSULE ORAL 2 TIMES DAILY
Status: DISCONTINUED | OUTPATIENT
Start: 2019-03-01 | End: 2019-03-02 | Stop reason: HOSPADM

## 2019-03-01 RX ORDER — PREDNISONE 10 MG/1
10 TABLET ORAL 2 TIMES DAILY
Status: DISCONTINUED | OUTPATIENT
Start: 2019-03-01 | End: 2019-03-02 | Stop reason: HOSPADM

## 2019-03-01 RX ADMIN — OSELTAMIVIR PHOSPHATE 75 MG: 75 CAPSULE ORAL at 04:03

## 2019-03-01 RX ADMIN — Medication 1 PATCH: at 09:03

## 2019-03-01 RX ADMIN — BUDESONIDE 0.5 MG: 0.5 SUSPENSION RESPIRATORY (INHALATION) at 07:03

## 2019-03-01 RX ADMIN — GABAPENTIN 800 MG: 400 CAPSULE ORAL at 09:03

## 2019-03-01 RX ADMIN — DULOXETINE 60 MG: 30 CAPSULE, DELAYED RELEASE ORAL at 09:03

## 2019-03-01 RX ADMIN — IPRATROPIUM BROMIDE AND ALBUTEROL SULFATE 3 ML: .5; 3 SOLUTION RESPIRATORY (INHALATION) at 12:03

## 2019-03-01 RX ADMIN — ENOXAPARIN SODIUM 40 MG: 100 INJECTION SUBCUTANEOUS at 04:03

## 2019-03-01 RX ADMIN — IPRATROPIUM BROMIDE AND ALBUTEROL SULFATE 3 ML: .5; 3 SOLUTION RESPIRATORY (INHALATION) at 11:03

## 2019-03-01 RX ADMIN — ACETAMINOPHEN 650 MG: 325 TABLET ORAL at 01:03

## 2019-03-01 RX ADMIN — HYDROCODONE BITARTRATE AND ACETAMINOPHEN 1 TABLET: 10; 325 TABLET ORAL at 07:03

## 2019-03-01 RX ADMIN — METHYLPREDNISOLONE SODIUM SUCCINATE 60 MG: 125 INJECTION, POWDER, FOR SOLUTION INTRAMUSCULAR; INTRAVENOUS at 01:03

## 2019-03-01 RX ADMIN — CEFTRIAXONE 2 G: 2 INJECTION, SOLUTION INTRAVENOUS at 03:03

## 2019-03-01 RX ADMIN — IPRATROPIUM BROMIDE AND ALBUTEROL SULFATE 3 ML: .5; 3 SOLUTION RESPIRATORY (INHALATION) at 03:03

## 2019-03-01 RX ADMIN — METHYLPREDNISOLONE SODIUM SUCCINATE 60 MG: 125 INJECTION, POWDER, FOR SOLUTION INTRAMUSCULAR; INTRAVENOUS at 05:03

## 2019-03-01 RX ADMIN — PANTOPRAZOLE SODIUM 40 MG: 40 TABLET, DELAYED RELEASE ORAL at 09:03

## 2019-03-01 RX ADMIN — IPRATROPIUM BROMIDE AND ALBUTEROL SULFATE 3 ML: .5; 3 SOLUTION RESPIRATORY (INHALATION) at 07:03

## 2019-03-01 RX ADMIN — HYDROCODONE BITARTRATE AND ACETAMINOPHEN 1 TABLET: 10; 325 TABLET ORAL at 05:03

## 2019-03-01 RX ADMIN — SODIUM CHLORIDE: 0.9 INJECTION, SOLUTION INTRAVENOUS at 09:03

## 2019-03-01 RX ADMIN — IPRATROPIUM BROMIDE AND ALBUTEROL SULFATE 3 ML: .5; 3 SOLUTION RESPIRATORY (INHALATION) at 08:03

## 2019-03-01 RX ADMIN — IPRATROPIUM BROMIDE AND ALBUTEROL SULFATE 3 ML: .5; 3 SOLUTION RESPIRATORY (INHALATION) at 04:03

## 2019-03-01 RX ADMIN — MONTELUKAST SODIUM 10 MG: 10 TABLET, FILM COATED ORAL at 09:03

## 2019-03-01 RX ADMIN — PREDNISONE 10 MG: 10 TABLET ORAL at 09:03

## 2019-03-01 RX ADMIN — FUROSEMIDE 20 MG: 20 TABLET ORAL at 09:03

## 2019-03-01 RX ADMIN — HYDROCODONE BITARTRATE AND ACETAMINOPHEN 1 TABLET: 10; 325 TABLET ORAL at 10:03

## 2019-03-01 RX ADMIN — GUAIFENESIN 600 MG: 600 TABLET, EXTENDED RELEASE ORAL at 09:03

## 2019-03-01 RX ADMIN — GABAPENTIN 800 MG: 400 CAPSULE ORAL at 03:03

## 2019-03-01 RX ADMIN — AZITHROMYCIN MONOHYDRATE 500 MG: 500 INJECTION, POWDER, LYOPHILIZED, FOR SOLUTION INTRAVENOUS at 03:03

## 2019-03-01 RX ADMIN — ALPRAZOLAM 1 MG: 1 TABLET ORAL at 09:03

## 2019-03-01 RX ADMIN — HYDROCODONE BITARTRATE AND ACETAMINOPHEN 1 TABLET: 10; 325 TABLET ORAL at 02:03

## 2019-03-01 NOTE — PLAN OF CARE
Problem: Adult Inpatient Plan of Care  Goal: Plan of Care Review  Outcome: Ongoing (interventions implemented as appropriate)  Patient c/o chronic back pain frequently, patient fixated on her pain medications. Frequently asks for them now and when she can have them again.

## 2019-03-01 NOTE — PROGRESS NOTES
Pt requested to be taken off of the 3l and turned to 1L. spo2 decreased to 83% with pursed lip breathing technique. Pt increased to 2l after refusing 3L. spo2 88-89% on 2l nc.

## 2019-03-01 NOTE — PLAN OF CARE
Problem: Adult Inpatient Plan of Care  Goal: Plan of Care Review  Outcome: Ongoing (interventions implemented as appropriate)  Pt is on nc 3l/m; tolerates txs well.

## 2019-03-01 NOTE — PLAN OF CARE
Problem: Adult Inpatient Plan of Care  Goal: Plan of Care Review  Outcome: Ongoing (interventions implemented as appropriate)  The patient has been sinus rhythm on the monitor. NS infusing at 75 mL/hr. Pain managed with prn medication. Pt has had an uneventful night and is resting quietly, will continue to monitor.

## 2019-03-02 VITALS
WEIGHT: 104.75 LBS | SYSTOLIC BLOOD PRESSURE: 147 MMHG | TEMPERATURE: 98 F | HEIGHT: 60 IN | OXYGEN SATURATION: 96 % | RESPIRATION RATE: 20 BRPM | DIASTOLIC BLOOD PRESSURE: 68 MMHG | BODY MASS INDEX: 20.56 KG/M2 | HEART RATE: 95 BPM

## 2019-03-02 LAB
ALBUMIN SERPL BCP-MCNC: 3.7 G/DL
ALP SERPL-CCNC: 57 U/L
ALT SERPL W/O P-5'-P-CCNC: 6 U/L
ANION GAP SERPL CALC-SCNC: 10 MMOL/L
AST SERPL-CCNC: 12 U/L
BASOPHILS # BLD AUTO: 0 K/UL
BASOPHILS NFR BLD: 0 %
BILIRUB SERPL-MCNC: 0.2 MG/DL
BUN SERPL-MCNC: 11 MG/DL
CALCIUM SERPL-MCNC: 9.2 MG/DL
CHLORIDE SERPL-SCNC: 94 MMOL/L
CO2 SERPL-SCNC: 35 MMOL/L
CREAT SERPL-MCNC: 0.6 MG/DL
DIFFERENTIAL METHOD: ABNORMAL
EOSINOPHIL # BLD AUTO: 0 K/UL
EOSINOPHIL NFR BLD: 0 %
ERYTHROCYTE [DISTWIDTH] IN BLOOD BY AUTOMATED COUNT: 13.7 %
EST. GFR  (AFRICAN AMERICAN): >60 ML/MIN/1.73 M^2
EST. GFR  (NON AFRICAN AMERICAN): >60 ML/MIN/1.73 M^2
GLUCOSE SERPL-MCNC: 98 MG/DL
HCT VFR BLD AUTO: 43.7 %
HGB BLD-MCNC: 14.2 G/DL
LYMPHOCYTES # BLD AUTO: 1.3 K/UL
LYMPHOCYTES NFR BLD: 11.6 %
MCH RBC QN AUTO: 31.6 PG
MCHC RBC AUTO-ENTMCNC: 32.5 G/DL
MCV RBC AUTO: 97 FL
MONOCYTES # BLD AUTO: 0.9 K/UL
MONOCYTES NFR BLD: 8.5 %
NEUTROPHILS # BLD AUTO: 8.7 K/UL
NEUTROPHILS NFR BLD: 79.9 %
PLATELET # BLD AUTO: 235 K/UL
PMV BLD AUTO: 9.4 FL
POTASSIUM SERPL-SCNC: 3.5 MMOL/L
PROT SERPL-MCNC: 7 G/DL
RBC # BLD AUTO: 4.5 M/UL
SODIUM SERPL-SCNC: 139 MMOL/L
WBC # BLD AUTO: 10.88 K/UL

## 2019-03-02 PROCEDURE — 94640 AIRWAY INHALATION TREATMENT: CPT

## 2019-03-02 PROCEDURE — 25000003 PHARM REV CODE 250: Performed by: NURSE PRACTITIONER

## 2019-03-02 PROCEDURE — 25000242 PHARM REV CODE 250 ALT 637 W/ HCPCS: Performed by: NURSE PRACTITIONER

## 2019-03-02 PROCEDURE — 25000003 PHARM REV CODE 250: Performed by: EMERGENCY MEDICINE

## 2019-03-02 PROCEDURE — 85025 COMPLETE CBC W/AUTO DIFF WBC: CPT

## 2019-03-02 PROCEDURE — 63600175 PHARM REV CODE 636 W HCPCS: Performed by: EMERGENCY MEDICINE

## 2019-03-02 PROCEDURE — 36415 COLL VENOUS BLD VENIPUNCTURE: CPT

## 2019-03-02 PROCEDURE — 80053 COMPREHEN METABOLIC PANEL: CPT

## 2019-03-02 RX ORDER — OSELTAMIVIR PHOSPHATE 75 MG/1
75 CAPSULE ORAL 2 TIMES DAILY
Qty: 10 CAPSULE | Refills: 0 | OUTPATIENT
Start: 2019-03-02 | End: 2019-03-02

## 2019-03-02 RX ORDER — HYDROCODONE BITARTRATE AND ACETAMINOPHEN 5; 325 MG/1; MG/1
1 TABLET ORAL EVERY 6 HOURS PRN
Qty: 12 TABLET | Refills: 0 | Status: SHIPPED | OUTPATIENT
Start: 2019-03-02

## 2019-03-02 RX ORDER — PREDNISONE 20 MG/1
40 TABLET ORAL DAILY
Qty: 20 TABLET | Refills: 0 | Status: SHIPPED | OUTPATIENT
Start: 2019-03-02 | End: 2019-03-12

## 2019-03-02 RX ORDER — LEVOFLOXACIN 500 MG/1
500 TABLET, FILM COATED ORAL DAILY
Qty: 7 TABLET | Refills: 0 | Status: SHIPPED | OUTPATIENT
Start: 2019-03-02 | End: 2019-03-02 | Stop reason: SDUPTHER

## 2019-03-02 RX ORDER — PREDNISONE 20 MG/1
40 TABLET ORAL DAILY
Qty: 20 TABLET | Refills: 0 | OUTPATIENT
Start: 2019-03-02 | End: 2019-03-02

## 2019-03-02 RX ORDER — OSELTAMIVIR PHOSPHATE 75 MG/1
75 CAPSULE ORAL 2 TIMES DAILY
Qty: 10 CAPSULE | Refills: 0 | Status: SHIPPED | OUTPATIENT
Start: 2019-03-02 | End: 2019-03-07

## 2019-03-02 RX ORDER — OSELTAMIVIR PHOSPHATE 75 MG/1
75 CAPSULE ORAL 2 TIMES DAILY
Qty: 10 CAPSULE | Refills: 0 | Status: SHIPPED | OUTPATIENT
Start: 2019-03-02 | End: 2019-03-02

## 2019-03-02 RX ORDER — LEVOFLOXACIN 500 MG/1
500 TABLET, FILM COATED ORAL DAILY
Qty: 7 TABLET | Refills: 0 | OUTPATIENT
Start: 2019-03-02 | End: 2019-03-02 | Stop reason: SDUPTHER

## 2019-03-02 RX ORDER — LEVOFLOXACIN 500 MG/1
500 TABLET, FILM COATED ORAL DAILY
Qty: 7 TABLET | Refills: 0 | Status: SHIPPED | OUTPATIENT
Start: 2019-03-02

## 2019-03-02 RX ORDER — PREDNISONE 20 MG/1
40 TABLET ORAL DAILY
Qty: 20 TABLET | Refills: 0 | Status: SHIPPED | OUTPATIENT
Start: 2019-03-02 | End: 2019-03-02

## 2019-03-02 RX ADMIN — OSELTAMIVIR PHOSPHATE 75 MG: 75 CAPSULE ORAL at 08:03

## 2019-03-02 RX ADMIN — PREDNISONE 10 MG: 10 TABLET ORAL at 08:03

## 2019-03-02 RX ADMIN — IPRATROPIUM BROMIDE AND ALBUTEROL SULFATE 3 ML: .5; 3 SOLUTION RESPIRATORY (INHALATION) at 07:03

## 2019-03-02 RX ADMIN — PANTOPRAZOLE SODIUM 40 MG: 40 TABLET, DELAYED RELEASE ORAL at 08:03

## 2019-03-02 RX ADMIN — HYDROCODONE BITARTRATE AND ACETAMINOPHEN 1 TABLET: 10; 325 TABLET ORAL at 10:03

## 2019-03-02 RX ADMIN — IPRATROPIUM BROMIDE AND ALBUTEROL SULFATE 3 ML: .5; 3 SOLUTION RESPIRATORY (INHALATION) at 04:03

## 2019-03-02 RX ADMIN — HYDROCODONE BITARTRATE AND ACETAMINOPHEN 1 TABLET: 10; 325 TABLET ORAL at 12:03

## 2019-03-02 RX ADMIN — DULOXETINE 60 MG: 30 CAPSULE, DELAYED RELEASE ORAL at 08:03

## 2019-03-02 RX ADMIN — HYDROCODONE BITARTRATE AND ACETAMINOPHEN 1 TABLET: 10; 325 TABLET ORAL at 05:03

## 2019-03-02 RX ADMIN — MONTELUKAST SODIUM 10 MG: 10 TABLET, FILM COATED ORAL at 08:03

## 2019-03-02 RX ADMIN — IPRATROPIUM BROMIDE AND ALBUTEROL SULFATE 3 ML: .5; 3 SOLUTION RESPIRATORY (INHALATION) at 11:03

## 2019-03-02 RX ADMIN — GUAIFENESIN 600 MG: 600 TABLET, EXTENDED RELEASE ORAL at 08:03

## 2019-03-02 RX ADMIN — GABAPENTIN 800 MG: 400 CAPSULE ORAL at 08:03

## 2019-03-02 RX ADMIN — BUDESONIDE 0.5 MG: 0.5 SUSPENSION RESPIRATORY (INHALATION) at 07:03

## 2019-03-02 NOTE — PROGRESS NOTES
Tele monitor removed and returned to MT room  PIV removed, catheter remained intact  Pt states her ride is bringing her home 02 for discharge  Home health to be arranged per CM  Hard scripts to be provided to the pt [place] [facility] [place] in [place] closes at 2 pm today

## 2019-03-02 NOTE — PLAN OF CARE
03/02/19 1222   Medicare Message   Important Message from Medicare regarding Discharge Appeal Rights Given to patient/caregiver;Explained to patient/caregiver;Signed/date by patient/caregiver   Date IMM was signed 03/02/19   Time IMM was signed 1216

## 2019-03-02 NOTE — PLAN OF CARE
OSMIN met with patient at the bedside.  She already has home oxygen.  She states that she has home health but could not recall the name of the company.  Choice form completed and placed in patient blue folder.  Patient will call CM back with name of the company so that a referral can be made.      @1228 CM received a call from patient.  Her current home health company is [employer].  CM will make referral.    @1300 OSMIN left a message with [employer]  answering [phone].  No return call.  OSMIN sent orders to [facility] via [facility].    @3416 CM spoke to Katia with [employer].  Katia made aware that patient is discharging today.

## 2019-03-02 NOTE — PROGRESS NOTES
[facility] Hospital Medicine  Progress Note    Patient Name: Jill Minaya  MRN: [id number]  Patient Class: IP- Inpatient   Admission Date: 2/27/2019  Length of Stay: 1 days  Attending Physician: Melisa Reagan MD  Primary Care Provider: Ruth Ann Zhu MD        Subjective:     Principal Problem:Acute respiratory failure with hypoxia    HPI:  Jill Minaya is a 68 year old female with a PMHx of HTN, Asthma, COPD, Chronic back pain, and former tobacco user (quit 2 weeks ago) who presented to the Emergency Department with c/o SOB. Associated symptoms include: subjective fever (103.6), chills, productive cough with yellow sputum, and body aches. Pt reports symptoms started yesterday. Went to [facility] today and diagnosed with Flu and pneumonia. Pt was sent to ED due to hypoxia. ED workup showed:  Normal WBC count, Hgb/Hct 13.9/43.3, mild renal insufficiency, lactic acidosis (2.3), procalcitonin 0.46. ABG PO2 47. CXR showed pulmonary edema. Pt fluid resuscitated in the ED. CT chest pending.     Hospital Course:  The pt is a 69 yo female with HTN, Asthma, severe COPD, Chronic back pain, and tobacco use who was placed in observation with Severe sepsis and bilateral pneumonia, Acute respiratory failure with hypoxia, COPD exacerbation, and Influenza A on IV Rocephin, IV Azithromycin, IVFs, IV steroids, and Tamiflu.   The pt reports symptoms have not improved. She remains SOB above baseline and loose cough.  Pt reports she has home oxygen but was not wearing it for several months- she reports she was told she does not need it.   Afebrile, WBC normal     Looks much better, wants to go home, breathing is easier, she quit smoking about 2-3 weeks ago.         Interval History:     Review of Systems   Constitutional: Positive for activity change. Negative for appetite change, chills, diaphoresis, fatigue, fever and unexpected weight change.   HENT: Negative for congestion, nosebleeds, sinus  pressure and sore throat.    Eyes: Negative for pain, discharge and visual disturbance.   Respiratory: Negative for cough, chest tightness, shortness of breath, wheezing and stridor.    Cardiovascular: Negative for chest pain, palpitations and leg swelling.   Gastrointestinal: Negative for abdominal distention, abdominal pain, blood in stool, constipation, diarrhea, nausea and vomiting.   Endocrine: Negative for cold intolerance and heat intolerance.   Genitourinary: Negative for difficulty urinating, dysuria, flank pain, frequency and urgency.   Musculoskeletal: Negative for arthralgias, back pain, joint swelling, myalgias, neck pain and neck stiffness.   Skin: Negative for rash and wound.   Allergic/Immunologic: Negative for food allergies and immunocompromised state.   Neurological: Negative for dizziness, seizures, syncope, facial asymmetry, speech difficulty, light-headedness, numbness and headaches.   Hematological: Negative for adenopathy.   Psychiatric/Behavioral: Negative for agitation, confusion and hallucinations.     Objective:     Vital Signs (Most Recent):  Temp: 97.3 °F (36.3 °C) (03/01/19 1929)  Pulse: 100 (03/01/19 1939)  Resp: 18 (03/01/19 1939)  BP: 132/62 (03/01/19 1929)  SpO2: 95 % (03/01/19 1939) Vital Signs (24h Range):  Temp:  [97.3 °F (36.3 °C)-98.1 °F (36.7 °C)] 97.3 °F (36.3 °C)  Pulse:  [] 100  Resp:  [16-20] 18  SpO2:  [84 %-98 %] 95 %  BP: (122-142)/(56-65) 132/62     Weight: 49.8 kg (109 lb 12.6 oz)  Body mass index is 21.44 kg/m².    Intake/Output Summary (Last 24 hours) at 3/1/2019 1945  Last data filed at 3/1/2019 1700  Gross per 24 hour   Intake 2940 ml   Output --   Net 2940 ml      Physical Exam   Constitutional: She is oriented to person, place, and time. No distress.   Sickly appearance    HENT:   Head: Normocephalic and atraumatic.   Nose: Nose normal.   Mouth/Throat: Oropharynx is clear and moist.   Eyes: Conjunctivae and EOM are normal. No scleral icterus.   Neck:  Normal range of motion. Neck supple. No tracheal deviation present.   Cardiovascular: Regular rhythm, normal heart sounds and intact distal pulses. Exam reveals no gallop and no friction rub.   No murmur heard.  Tachycardia    Pulmonary/Chest: Effort normal. No stridor. No respiratory distress. She has wheezes. She has no rales. She exhibits no tenderness.   + conversational dyspnea  Wheeze and rhonchi bilaterally    Abdominal: Soft. Bowel sounds are normal. She exhibits no distension and no mass. There is no tenderness. There is no rebound and no guarding.   Musculoskeletal: Normal range of motion. She exhibits no edema, tenderness or deformity.   Neurological: She is alert and oriented to person, place, and time. No cranial nerve deficit. She exhibits normal muscle tone. Coordination normal.   Skin: Skin is warm and dry. No rash noted. She is not diaphoretic. No erythema. No pallor.   Psychiatric: She has a normal mood and affect. Her behavior is normal. Thought content normal.   Nursing note and vitals reviewed.      Significant Labs:   Blood Culture:   BMP:   Recent Labs   Lab 03/01/19  0624   *      K 3.6      CO2 32*   BUN 9   CREATININE 0.6   CALCIUM 8.8     CBC:   Recent Labs   Lab 02/28/19  0459 03/01/19  0624   WBC 3.88* 6.66   HGB 12.2 12.4   HCT 38.4 38.6    190     CMP:   Recent Labs   Lab 02/28/19 0459 03/01/19  0624    142   K 3.8 3.6    100   CO2 31* 32*   * 126*   BUN 9 9   CREATININE 0.6 0.6   CALCIUM 8.5* 8.8   PROT 6.0 6.0   ALBUMIN 3.2* 3.1*   BILITOT 0.2 0.2   ALKPHOS 58 50*   AST 13 11   ALT 8* <5*   ANIONGAP 7* 10   EGFRNONAA >60 >60     Respiratory Culture:   Recent Labs   Lab 02/28/19  1548   GSRESP <10 epithelial cells per low power field.  Few WBC's  No organisms seen     Troponin:   All pertinent labs within the past 24 hours have been reviewed.    Significant Imaging: I have reviewed all pertinent imaging results/findings within the past  24 hours.    Assessment/Plan:      * Acute respiratory failure with hypoxia    - PO2 47. Likely secondary to influenz, PNA,  and COPD exacerbation  Will treat underlying cause for now with IV antibiotics, PO antiviral, IV steroids, LABA/SAKSHI/ICS, supplemental oxygen    Improved, breathing easier.  Change solumedrol to prednisone and d/c soon     Renal insufficiency    Improved after IV hydration   -- Repeat BMP in AM       Severe sepsis due to bilateral pneumonia     - Blood cultures show NGTD  - Continue IV Rocephin/Azithromycin   - Continue PO Tamiflu for tx of flu  Cont iVFs  Sputum culture pending \    No obvious pneumonia so will d/c IV abx and give small dose Lasix       Essential hypertension    - Borderline hypotensive  - Hold Amlodipine at this time. Will continue to monitor BP and will restart once BP can tolerate       COPD exacerbation    - Pt is followed by Dr. Madelin Dos Santos (pulmonologist)  - IV steroids, ICS, LABA/SAKSHI, supplemental oxygen  Pt reports she has home oxygen and portable tanks- but was told she did not need it     Improved, start oral prednisone       Influenza    - (+) influenza at [facility] prior to arrival today  - Influenza pending  - Continue PO Tamiflu         VTE Risk Mitigation (From admission, onward)        Ordered     enoxaparin injection 40 mg  Daily      02/27/19 1847     IP VTE LOW RISK PATIENT  Once      02/27/19 1804     Place ROME hose  Until discontinued      02/27/19 1804              Melisa Reagan MD  Department of Hospital Medicine   [facility]

## 2019-03-02 NOTE — ASSESSMENT & PLAN NOTE
- Pt is followed by Dr. Madelin Dos Santos (pulmonologist)  - IV steroids, ICS, LABA/SAKSHI, supplemental oxygen  Pt reports she has home oxygen and portable tanks- but was told she did not need it     Improved, start oral prednisone

## 2019-03-02 NOTE — PLAN OF CARE
Problem: Adult Inpatient Plan of Care  Goal: Patient-Specific Goal (Individualization)  Outcome: Ongoing (interventions implemented as appropriate)  Pt in bed AAOx4. Plan of Care reviewed, Verbalized understanding.   Patient remained free from falls, fall precautions in place.  Patient is NSR on monitor.VSS.  Oxygen Therapy  Call bell and personal belongings within reach. Hourly rounding complete. Reminded to call for assistance. No complaints at this time. Will continue to monitor.

## 2019-03-02 NOTE — ASSESSMENT & PLAN NOTE
- PO2 47. Likely secondary to influenz, PNA,  and COPD exacerbation  Will treat underlying cause for now with IV antibiotics, PO antiviral, IV steroids, LABA/SAKSHI/ICS, supplemental oxygen    Improved, breathing easier.  Change solumedrol to prednisone and d/c soon

## 2019-03-02 NOTE — SUBJECTIVE & OBJECTIVE
Interval History:     Review of Systems   Constitutional: Positive for activity change. Negative for appetite change, chills, diaphoresis, fatigue, fever and unexpected weight change.   HENT: Negative for congestion, nosebleeds, sinus pressure and sore throat.    Eyes: Negative for pain, discharge and visual disturbance.   Respiratory: Negative for cough, chest tightness, shortness of breath, wheezing and stridor.    Cardiovascular: Negative for chest pain, palpitations and leg swelling.   Gastrointestinal: Negative for abdominal distention, abdominal pain, blood in stool, constipation, diarrhea, nausea and vomiting.   Endocrine: Negative for cold intolerance and heat intolerance.   Genitourinary: Negative for difficulty urinating, dysuria, flank pain, frequency and urgency.   Musculoskeletal: Negative for arthralgias, back pain, joint swelling, myalgias, neck pain and neck stiffness.   Skin: Negative for rash and wound.   Allergic/Immunologic: Negative for food allergies and immunocompromised state.   Neurological: Negative for dizziness, seizures, syncope, facial asymmetry, speech difficulty, light-headedness, numbness and headaches.   Hematological: Negative for adenopathy.   Psychiatric/Behavioral: Negative for agitation, confusion and hallucinations.     Objective:     Vital Signs (Most Recent):  Temp: 97.3 °F (36.3 °C) (03/01/19 1929)  Pulse: 100 (03/01/19 1939)  Resp: 18 (03/01/19 1939)  BP: 132/62 (03/01/19 1929)  SpO2: 95 % (03/01/19 1939) Vital Signs (24h Range):  Temp:  [97.3 °F (36.3 °C)-98.1 °F (36.7 °C)] 97.3 °F (36.3 °C)  Pulse:  [] 100  Resp:  [16-20] 18  SpO2:  [84 %-98 %] 95 %  BP: (122-142)/(56-65) 132/62     Weight: 49.8 kg (109 lb 12.6 oz)  Body mass index is 21.44 kg/m².    Intake/Output Summary (Last 24 hours) at 3/1/2019 1945  Last data filed at 3/1/2019 1700  Gross per 24 hour   Intake 2940 ml   Output --   Net 2940 ml      Physical Exam   Constitutional: She is oriented to person,  place, and time. No distress.   Sickly appearance    HENT:   Head: Normocephalic and atraumatic.   Nose: Nose normal.   Mouth/Throat: Oropharynx is clear and moist.   Eyes: Conjunctivae and EOM are normal. No scleral icterus.   Neck: Normal range of motion. Neck supple. No tracheal deviation present.   Cardiovascular: Regular rhythm, normal heart sounds and intact distal pulses. Exam reveals no gallop and no friction rub.   No murmur heard.  Tachycardia    Pulmonary/Chest: Effort normal. No stridor. No respiratory distress. She has wheezes. She has no rales. She exhibits no tenderness.   + conversational dyspnea  Wheeze and rhonchi bilaterally    Abdominal: Soft. Bowel sounds are normal. She exhibits no distension and no mass. There is no tenderness. There is no rebound and no guarding.   Musculoskeletal: Normal range of motion. She exhibits no edema, tenderness or deformity.   Neurological: She is alert and oriented to person, place, and time. No cranial nerve deficit. She exhibits normal muscle tone. Coordination normal.   Skin: Skin is warm and dry. No rash noted. She is not diaphoretic. No erythema. No pallor.   Psychiatric: She has a normal mood and affect. Her behavior is normal. Thought content normal.   Nursing note and vitals reviewed.      Significant Labs:   Blood Culture:   BMP:   Recent Labs   Lab 03/01/19  0624   *      K 3.6      CO2 32*   BUN 9   CREATININE 0.6   CALCIUM 8.8     CBC:   Recent Labs   Lab 02/28/19  0459 03/01/19  0624   WBC 3.88* 6.66   HGB 12.2 12.4   HCT 38.4 38.6    190     CMP:   Recent Labs   Lab 02/28/19  0459 03/01/19  0624    142   K 3.8 3.6    100   CO2 31* 32*   * 126*   BUN 9 9   CREATININE 0.6 0.6   CALCIUM 8.5* 8.8   PROT 6.0 6.0   ALBUMIN 3.2* 3.1*   BILITOT 0.2 0.2   ALKPHOS 58 50*   AST 13 11   ALT 8* <5*   ANIONGAP 7* 10   EGFRNONAA >60 >60     Respiratory Culture:   Recent Labs   Lab 02/28/19  1548   GSRESP <10 epithelial  cells per low power field.  Few WBC's  No organisms seen     Troponin:   All pertinent labs within the past 24 hours have been reviewed.    Significant Imaging: I have reviewed all pertinent imaging results/findings within the past 24 hours.

## 2019-03-02 NOTE — ASSESSMENT & PLAN NOTE
- Blood cultures show NGTD  - Continue IV Rocephin/Azithromycin   - Continue PO Tamiflu for tx of flu  Cont iVFs  Sputum culture pending \    No obvious pneumonia so will d/c IV abx and give small dose Lasix

## 2019-03-02 NOTE — PROGRESS NOTES
Pt wanting to leave, she states she spoke with the CM and signed her papers for HH. AVS reviewed with the pt, she denies having further questions. She states she will call to arrange her own follow up as they are not with [facility].

## 2019-03-04 LAB
BACTERIA BLD CULT: NORMAL
BACTERIA BLD CULT: NORMAL
BACTERIA SPEC AEROBE CULT: NORMAL
BACTERIA SPEC AEROBE CULT: NORMAL
GRAM STN SPEC: NORMAL

## 2019-03-12 NOTE — DISCHARGE SUMMARY
[facility] Hospital Medicine  Discharge Summary      Patient Name: Jill Minaya  MRN: [id number]  Admission Date: 2/27/2019  Hospital Length of Stay: 3 days  Discharge Date and Time:  03/12/2019 12:21 PM  Attending Physician: No att. providers found   Discharging Provider: Melisa Reagan MD  Primary Care Provider: Ruth Ann Zhu MD      HPI:   Jill Minaya is a 68 year old female with a PMHx of HTN, Asthma, COPD, Chronic back pain, and former tobacco user (quit 2 weeks ago) who presented to the Emergency Department with c/o SOB. Associated symptoms include: subjective fever (103.6), chills, productive cough with yellow sputum, and body aches. Pt reports symptoms started yesterday. Went to [facility] today and diagnosed with Flu and pneumonia. Pt was sent to ED due to hypoxia. ED workup showed:  Normal WBC count, Hgb/Hct 13.9/43.3, mild renal insufficiency, lactic acidosis (2.3), procalcitonin 0.46. ABG PO2 47. CXR showed pulmonary edema. Pt fluid resuscitated in the ED. CT chest pending.     * No surgery found *      Hospital Course:   The pt is a 69 yo female with HTN, Asthma, severe COPD, Chronic back pain, and tobacco use who was placed in observation with Severe sepsis and bilateral pneumonia, Acute respiratory failure with hypoxia, COPD exacerbation, and Influenza A on IV Rocephin, IV Azithromycin, IVFs, IV steroids, and Tamiflu.   The pt reports symptoms have not improved. She remains SOB above baseline and loose cough.  Pt reports she has home oxygen but was not wearing it for several months- she reports she was told she does not need it.   Afebrile, WBC normal     Looks much better, wants to go home, breathing is easier, she quit smoking about 2-3 weeks ago. She was switched to oral Prednisone and Levaquin which she tolerated well and was deemed stable for discharge home on 3/2/19.               Consults:     No new Assessment & Plan notes have been filed under this  hospital service since the last note was generated.  Service: Hospital Medicine    Final Active Diagnoses:    Diagnosis Date Noted POA    PRINCIPAL PROBLEM:  Acute respiratory failure with hypoxia [J96.01] 02/27/2019 Yes    Influenza [J11.1] 02/27/2019 Yes    COPD exacerbation [J44.1] 02/27/2019 Yes    Essential hypertension [I10] 02/27/2019 Yes    Severe sepsis due to bilateral pneumonia  [A41.9, R65.20] 02/27/2019 Yes    Renal insufficiency [N28.9] 02/27/2019 Yes      Problems Resolved During this Admission:       Discharged Condition: stable    Disposition: Home or Self Care    Follow Up:  Follow-up Information     Ruth Ann Zhu MD In 3 days.    Specialty:  Family Medicine  Contact information:  [address] [id number] [phone]             [facility]    Specialty:  Home Health Services  Why:  Home Health  Contact information:  [address]  [address]  [place] [id number] [phone]                 Patient Instructions:   No discharge procedures on file.    Significant Diagnostic Studies: All Labs past 24 hours have been reviewed    Microbiology:   Blood Culture   Lab Results   Component Value Date    LABBLOO No growth after 5 days. 02/27/2019    LABBLOO No growth after 5 days. 02/27/2019    and Sputum Culture   Lab Results   Component Value Date    GSRESP <10 epithelial cells per low power field. 02/28/2019    GSRESP Few WBC's 02/28/2019    GSRESP No organisms seen 02/28/2019    RESPIRATORYC Normal respiratory tiki 02/28/2019    RESPIRATORYC No S aureus or Pseudomonas isolated. 02/28/2019         Pending Diagnostic Studies:     None         Medications:   Reconciled Home Medications:      Medication List      START taking these medications    HYDROcodone-acetaminophen 5-325 mg per tablet  Commonly known as:  NORCO  Take 1 tablet by mouth every 6 (six) hours as needed for Pain.  Replaces:  HYDROcodone-acetaminophen  mg per tablet      levoFLOXacin 500 MG tablet  Commonly known as:  LEVAQUIN  Take 1 tablet (500 mg total) by mouth once daily.     predniSONE 20 MG tablet  Commonly known as:  DELTASONE  Take 2 tablets (40 mg total) by mouth once daily. for 10 days        CONTINUE taking these medications    albuterol-ipratropium 2.5 mg-0.5 mg/3 mL nebulizer solution  Commonly known as:  DUO-NEB  Inhale 3 mLs into the lungs every 6 (six) hours as needed.     ALPRAZolam 1 MG tablet  Commonly known as:  XANAX  Take 1 mg by mouth 2 (two) times daily as needed for Anxiety.     amLODIPine 10 MG tablet  Commonly known as:  NORVASC  Take 10 mg by mouth once daily.     azelastine 137 mcg (0.1 %) nasal spray  Commonly known as:  ASTELIN  2 sprays by Nasal route 2 (two) times daily.     * budesonide-formoterol 160-4.5 mcg 160-4.5 mcg/actuation Hfaa  Commonly known as:  SYMBICORT  Inhale 2 puffs into the lungs 2 (two) times daily.     * SYMBICORT 160-4.5 mcg/actuation Hfaa  Generic drug:  budesonide-formoterol 160-4.5 mcg  1 puff 2 (two) times daily.     cyclobenzaprine 10 MG tablet  Commonly known as:  FLEXERIL  Take 10 mg by mouth 3 (three) times daily as needed.     DULoxetine 60 MG capsule  Commonly known as:  CYMBALTA  Take 60 mg by mouth once daily.     esomeprazole 40 MG capsule  Commonly known as:  NEXIUM  Take 40 mg by mouth once daily.     eszopiclone 3 mg Tab  Commonly known as:  LUNESTA  Take 3 mg by mouth nightly.     gabapentin 800 MG tablet  Commonly known as:  NEURONTIN  Take 800 mg by mouth 3 (three) times daily.     ipratropium 17 mcg/actuation inhaler  Commonly known as:  ATROVENT HFA  Inhale 2 puffs into the lungs 2 (two) times daily.     montelukast 10 mg tablet  Commonly known as:  SINGULAIR  Take 10 mg by mouth once daily.     PROAIR HFA 90 mcg/actuation inhaler  Generic drug:  albuterol  2 puffs 4 (four) times daily.         * This list has 2 medication(s) that are the same as other medications prescribed for you. Read the directions  carefully, and ask your doctor or other care provider to review them with you.            STOP taking these medications    HYDROcodone-acetaminophen  mg per tablet  Commonly known as:  NORCO  Replaced by:  HYDROcodone-acetaminophen 5-325 mg per tablet        ASK your doctor about these medications    oseltamivir 75 MG capsule  Commonly known as:  TAMIFLU  Take 1 capsule (75 mg total) by mouth 2 (two) times daily. for 5 days  Ask about: Should I take this medication?            Indwelling Lines/Drains at time of discharge:   Lines/Drains/Airways          None          Time spent on the discharge of patient: 33 minutes  Patient was seen and examined on the date of discharge and determined to be suitable for discharge.         Melisa Reagan MD  Department of Hospital Medicine  [facility]

## 2019-03-26 ENCOUNTER — TELEPHONE (OUTPATIENT)
Dept: ADMINISTRATIVE | Facility: CLINIC | Age: 69
End: 2019-03-26

## 2019-03-26 NOTE — TELEPHONE ENCOUNTER
Home Health SOC 02/18/2019 - 04/18/2019 with [facility] ([facility]) - Dr. Miguel Watts.  services.

## 2019-12-07 ENCOUNTER — HOSPITAL ENCOUNTER (EMERGENCY)
Facility: HOSPITAL | Age: 69
Discharge: HOME | End: 2019-12-07
Attending: EMERGENCY MEDICINE
Payer: MEDICARE

## 2019-12-07 VITALS
HEIGHT: 61 IN | DIASTOLIC BLOOD PRESSURE: 61 MMHG | WEIGHT: 112.56 LBS | BODY MASS INDEX: 21.25 KG/M2 | OXYGEN SATURATION: 98 % | RESPIRATION RATE: 18 BRPM | HEART RATE: 98 BPM | SYSTOLIC BLOOD PRESSURE: 144 MMHG | TEMPERATURE: 98 F

## 2019-12-07 DIAGNOSIS — R06.02 SOB (SHORTNESS OF BREATH): ICD-10-CM

## 2019-12-07 DIAGNOSIS — J44.1 COPD EXACERBATION: Primary | ICD-10-CM

## 2019-12-07 LAB
ALBUMIN SERPL BCP-MCNC: 3.9 G/DL (ref 3.5–5.2)
ALLENS TEST: ABNORMAL
ALP SERPL-CCNC: 82 U/L (ref 55–135)
ALT SERPL W/O P-5'-P-CCNC: 10 U/L (ref 10–44)
ANION GAP SERPL CALC-SCNC: 13 MMOL/L (ref 8–16)
AST SERPL-CCNC: 16 U/L (ref 10–40)
BASOPHILS # BLD AUTO: 0.05 K/UL (ref 0–0.2)
BASOPHILS NFR BLD: 0.5 % (ref 0–1.9)
BILIRUB SERPL-MCNC: 0.3 MG/DL (ref 0.1–1)
BILIRUB UR QL STRIP: NEGATIVE
BNP SERPL-MCNC: 45 PG/ML (ref 0–99)
BUN SERPL-MCNC: 10 MG/DL (ref 8–23)
CALCIUM SERPL-MCNC: 9.2 MG/DL (ref 8.7–10.5)
CHLORIDE SERPL-SCNC: 96 MMOL/L (ref 95–110)
CLARITY UR: CLEAR
CO2 SERPL-SCNC: 31 MMOL/L (ref 23–29)
COLOR UR: YELLOW
CREAT SERPL-MCNC: 0.9 MG/DL (ref 0.5–1.4)
D DIMER PPP IA.FEU-MCNC: 0.35 MG/L FEU
DELSYS: ABNORMAL
DIFFERENTIAL METHOD: ABNORMAL
EOSINOPHIL # BLD AUTO: 0.3 K/UL (ref 0–0.5)
EOSINOPHIL NFR BLD: 2.3 % (ref 0–8)
ERYTHROCYTE [DISTWIDTH] IN BLOOD BY AUTOMATED COUNT: 13.2 % (ref 11.5–14.5)
EST. GFR  (AFRICAN AMERICAN): >60 ML/MIN/1.73 M^2
EST. GFR  (NON AFRICAN AMERICAN): >60 ML/MIN/1.73 M^2
FIO2: 28
GLUCOSE SERPL-MCNC: 100 MG/DL (ref 70–110)
GLUCOSE UR QL STRIP: NEGATIVE
HCO3 UR-SCNC: 39 MMOL/L (ref 24–28)
HCT VFR BLD AUTO: 44.8 % (ref 37–48.5)
HGB BLD-MCNC: 14.3 G/DL (ref 12–16)
HGB UR QL STRIP: NEGATIVE
IMM GRANULOCYTES # BLD AUTO: 0.04 K/UL (ref 0–0.04)
IMM GRANULOCYTES NFR BLD AUTO: 0.4 % (ref 0–0.5)
KETONES UR QL STRIP: NEGATIVE
LACTATE SERPL-SCNC: 1.2 MMOL/L (ref 0.5–2.2)
LEUKOCYTE ESTERASE UR QL STRIP: NEGATIVE
LYMPHOCYTES # BLD AUTO: 2.7 K/UL (ref 1–4.8)
LYMPHOCYTES NFR BLD: 25 % (ref 18–48)
MCH RBC QN AUTO: 31.1 PG (ref 27–31)
MCHC RBC AUTO-ENTMCNC: 31.9 G/DL (ref 32–36)
MCV RBC AUTO: 97 FL (ref 82–98)
MODE: ABNORMAL
MONOCYTES # BLD AUTO: 0.7 K/UL (ref 0.3–1)
MONOCYTES NFR BLD: 6.4 % (ref 4–15)
NEUTROPHILS # BLD AUTO: 7.1 K/UL (ref 1.8–7.7)
NEUTROPHILS NFR BLD: 65.4 % (ref 38–73)
NITRITE UR QL STRIP: NEGATIVE
NRBC BLD-RTO: 0 /100 WBC
PCO2 BLDA: 51.4 MMHG (ref 35–45)
PH SMN: 7.49 [PH] (ref 7.35–7.45)
PH UR STRIP: 6 [PH] (ref 5–8)
PLATELET # BLD AUTO: 358 K/UL (ref 150–350)
PMV BLD AUTO: 8.8 FL (ref 9.2–12.9)
PO2 BLDA: 31 MMHG (ref 80–100)
POC BE: 16 MMOL/L
POC SATURATED O2: 63 % (ref 95–100)
POTASSIUM SERPL-SCNC: 4 MMOL/L (ref 3.5–5.1)
PROT SERPL-MCNC: 7.4 G/DL (ref 6–8.4)
PROT UR QL STRIP: NEGATIVE
RBC # BLD AUTO: 4.6 M/UL (ref 4–5.4)
SAMPLE: ABNORMAL
SITE: ABNORMAL
SODIUM SERPL-SCNC: 140 MMOL/L (ref 136–145)
SP GR UR STRIP: <=1.005 (ref 1–1.03)
TROPONIN I SERPL DL<=0.01 NG/ML-MCNC: <0.006 NG/ML (ref 0–0.03)
URN SPEC COLLECT METH UR: ABNORMAL
UROBILINOGEN UR STRIP-ACNC: NEGATIVE EU/DL
WBC # BLD AUTO: 10.89 K/UL (ref 3.9–12.7)

## 2019-12-07 PROCEDURE — 87040 BLOOD CULTURE FOR BACTERIA: CPT

## 2019-12-07 PROCEDURE — 93010 EKG 12-LEAD: ICD-10-PCS | Mod: ,,, | Performed by: INTERNAL MEDICINE

## 2019-12-07 PROCEDURE — 96374 THER/PROPH/DIAG INJ IV PUSH: CPT

## 2019-12-07 PROCEDURE — 85025 COMPLETE CBC W/AUTO DIFF WBC: CPT

## 2019-12-07 PROCEDURE — 63600175 PHARM REV CODE 636 W HCPCS: Performed by: EMERGENCY MEDICINE

## 2019-12-07 PROCEDURE — 36415 COLL VENOUS BLD VENIPUNCTURE: CPT

## 2019-12-07 PROCEDURE — 94640 AIRWAY INHALATION TREATMENT: CPT

## 2019-12-07 PROCEDURE — 84484 ASSAY OF TROPONIN QUANT: CPT

## 2019-12-07 PROCEDURE — 93010 ELECTROCARDIOGRAM REPORT: CPT | Mod: ,,, | Performed by: INTERNAL MEDICINE

## 2019-12-07 PROCEDURE — 99285 EMERGENCY DEPT VISIT HI MDM: CPT | Mod: 25

## 2019-12-07 PROCEDURE — 81003 URINALYSIS AUTO W/O SCOPE: CPT

## 2019-12-07 PROCEDURE — 80053 COMPREHEN METABOLIC PANEL: CPT

## 2019-12-07 PROCEDURE — 93005 ELECTROCARDIOGRAM TRACING: CPT

## 2019-12-07 PROCEDURE — 83880 ASSAY OF NATRIURETIC PEPTIDE: CPT

## 2019-12-07 PROCEDURE — 85379 FIBRIN DEGRADATION QUANT: CPT

## 2019-12-07 PROCEDURE — 25000242 PHARM REV CODE 250 ALT 637 W/ HCPCS: Performed by: EMERGENCY MEDICINE

## 2019-12-07 PROCEDURE — 83605 ASSAY OF LACTIC ACID: CPT

## 2019-12-07 PROCEDURE — 82803 BLOOD GASES ANY COMBINATION: CPT

## 2019-12-07 PROCEDURE — 99900035 HC TECH TIME PER 15 MIN (STAT)

## 2019-12-07 RX ORDER — ZOLPIDEM TARTRATE 5 MG/1
5 TABLET ORAL NIGHTLY PRN
COMMUNITY

## 2019-12-07 RX ORDER — METHYLPREDNISOLONE SOD SUCC 125 MG
125 VIAL (EA) INJECTION
Status: COMPLETED | OUTPATIENT
Start: 2019-12-07 | End: 2019-12-07

## 2019-12-07 RX ORDER — IPRATROPIUM BROMIDE AND ALBUTEROL SULFATE 2.5; .5 MG/3ML; MG/3ML
3 SOLUTION RESPIRATORY (INHALATION)
Status: COMPLETED | OUTPATIENT
Start: 2019-12-07 | End: 2019-12-07

## 2019-12-07 RX ORDER — BUSPIRONE HYDROCHLORIDE 7.5 MG/1
7.5 TABLET ORAL DAILY
COMMUNITY

## 2019-12-07 RX ORDER — PREDNISONE 20 MG/1
40 TABLET ORAL DAILY
Qty: 10 TABLET | Refills: 0 | Status: SHIPPED | OUTPATIENT
Start: 2019-12-07 | End: 2019-12-12

## 2019-12-07 RX ADMIN — IPRATROPIUM BROMIDE AND ALBUTEROL SULFATE 3 ML: .5; 3 SOLUTION RESPIRATORY (INHALATION) at 10:12

## 2019-12-07 RX ADMIN — METHYLPREDNISOLONE SODIUM SUCCINATE 125 MG: 125 INJECTION, POWDER, FOR SOLUTION INTRAMUSCULAR; INTRAVENOUS at 10:12

## 2019-12-08 NOTE — ED PROVIDER NOTES
SCRIBE #1 NOTE: I, Shazia Reed, am scribing for, and in the presence of, Scott Garcia MD. I have scribed the entire note.       History     Chief Complaint   Patient presents with    Shortness of Breath     pt is on 4th round of antibiotics for URI, currently taking azithromycin, pt states SOB has not improved     Review of patient's allergies indicates:  No Known Allergies      History of Present Illness     HPI    2019, 9:27 PM  History obtained from the patient      History of Present Illness: Jill Minaya is a 69 y.o. female patient with a PMHx of tobacco abuse who presents to the Emergency Department for evaluation of SOB which onset gradually over the last few weeks. Pt reports that she is on her 4th round of different antibiotic treatments, but SOB sx have not improved. Symptoms are constant and moderate in severity. No mitigating or exacerbating factors reported. Associated sxs include chest tightness, cough, and wheezing. Patient denies any fever/ chills, choking, CP, palpations, numbness, HA, dizziness, rash, wound, abdominal pain, n/v/d, back/ neck pain, sore throat, congestion, dysuria, hematuria, localized weakness, easily bruising, and all other sxs at this time. Prior Tx includes 4 different round of antibiotics and steroid treatment. No further complaints or concerns at this time.     Arrival mode: Personal vehicle      PCP: Ruth Ann Zhu MD        Past Medical History:  Past Medical History:   Diagnosis Date    Smoker within last 12 months 2019    smoker     Past Surgical History:  History reviewed. No pertinent surgical history.    Family History:  History reviewed. No pertinent family history.    Social History:  Social History     Tobacco Use    Smoking status: Former Smoker     Types: Cigarettes     Last attempt to quit: 2019     Years since quittin.8    Smokeless tobacco: Never Used   Substance and Sexual Activity    Alcohol use: Unknown     Drug use: Unknown    Sexual activity: Unknown        Review of Systems     Review of Systems   Constitutional: Negative for chills and fever.   HENT: Negative for congestion and sore throat.    Respiratory: Positive for cough, chest tightness, shortness of breath and wheezing. Negative for choking.    Cardiovascular: Negative for chest pain and palpitations.   Gastrointestinal: Negative for abdominal pain, diarrhea, nausea and vomiting.   Genitourinary: Negative for dysuria and hematuria.   Musculoskeletal: Negative for back pain and neck pain.   Skin: Negative for rash and wound.   Neurological: Negative for dizziness, weakness, numbness and headaches.   Hematological: Does not bruise/bleed easily.   All other systems reviewed and are negative.     Physical Exam     Initial Vitals [12/07/19 2052]   BP Pulse Resp Temp SpO2   125/65 100 20 98.4 °F (36.9 °C) (!) 91 %      MAP       --          Physical Exam  Nursing Notes and Vital Signs Reviewed.  Constitutional: Patient is in no acute distress. Well-developed and well-nourished.  Head: Atraumatic. Normocephalic.  Eyes: PERRL. EOM intact. Conjunctivae are not pale. No scleral icterus.  ENT: Mucous membranes are moist. Oropharynx is clear and symmetric.    Neck: Supple. Full ROM. No lymphadenopathy.  Cardiovascular: Regular rate. Regular rhythm. No murmurs, rubs, or gallops. Distal pulses are 2+ and symmetric.  Pulmonary/Chest: No respiratory distress. No rales. Expiratory wheezes.   Abdominal: Soft and non-distended.  There is no tenderness.  No rebound, guarding, or rigidity. Good bowel sounds.  Genitourinary: No CVA tenderness  Musculoskeletal: Moves all extremities. No obvious deformities. No edema. No calf tenderness.  Skin: Warm and dry.  Neurological:  Alert, awake, and appropriate.  Normal speech.  No acute focal neurological deficits are appreciated.  Psychiatric: Normal affect. Good eye contact. Appropriate in content.     ED Course   Procedures  ED  "Vital Signs:  Vitals:    12/07/19 2052 12/07/19 2200 12/07/19 2243   BP: 125/65  131/60   Pulse: 100 100 87   Resp: 20 20    Temp: 98.4 °F (36.9 °C)     TempSrc: Oral     SpO2: (!) 91% (!) 77% 95%   Weight: 51.1 kg (112 lb 8.7 oz)     Height: 5' 0.5" (1.537 m)         Abnormal Lab Results:  Labs Reviewed   CBC W/ AUTO DIFFERENTIAL - Abnormal; Notable for the following components:       Result Value    Mean Corpuscular Hemoglobin 31.1 (*)     Mean Corpuscular Hemoglobin Conc 31.9 (*)     Platelets 358 (*)     MPV 8.8 (*)     All other components within normal limits   COMPREHENSIVE METABOLIC PANEL - Abnormal; Notable for the following components:    CO2 31 (*)     All other components within normal limits   URINALYSIS, REFLEX TO URINE CULTURE - Abnormal; Notable for the following components:    Specific Gravity, UA <=1.005 (*)     All other components within normal limits    Narrative:     Preferred Collection Type->Urine, Clean Catch   ISTAT PROCEDURE - Abnormal; Notable for the following components:    POC PH 7.489 (*)     POC PCO2 51.4 (*)     POC PO2 31 (*)     POC HCO3 39.0 (*)     POC SATURATED O2 63 (*)     All other components within normal limits   CULTURE, BLOOD   CULTURE, BLOOD   INFLUENZA A & B BY MOLECULAR   LACTIC ACID, PLASMA   B-TYPE NATRIURETIC PEPTIDE   TROPONIN I   D DIMER, QUANTITATIVE        All Lab Results:  Results for orders placed or performed during the hospital encounter of 12/07/19   Lactic acid, plasma   Result Value Ref Range    Lactate (Lactic Acid) 1.2 0.5 - 2.2 mmol/L   CBC auto differential   Result Value Ref Range    WBC 10.89 3.90 - 12.70 K/uL    RBC 4.60 4.00 - 5.40 M/uL    Hemoglobin 14.3 12.0 - 16.0 g/dL    Hematocrit 44.8 37.0 - 48.5 %    Mean Corpuscular Volume 97 82 - 98 fL    Mean Corpuscular Hemoglobin 31.1 (H) 27.0 - 31.0 pg    Mean Corpuscular Hemoglobin Conc 31.9 (L) 32.0 - 36.0 g/dL    RDW 13.2 11.5 - 14.5 %    Platelets 358 (H) 150 - 350 K/uL    MPV 8.8 (L) 9.2 - 12.9 " fL    Immature Granulocytes 0.4 0.0 - 0.5 %    Gran # (ANC) 7.1 1.8 - 7.7 K/uL    Immature Grans (Abs) 0.04 0.00 - 0.04 K/uL    Lymph # 2.7 1.0 - 4.8 K/uL    Mono # 0.7 0.3 - 1.0 K/uL    Eos # 0.3 0.0 - 0.5 K/uL    Baso # 0.05 0.00 - 0.20 K/uL    nRBC 0 0 /100 WBC    Gran% 65.4 38.0 - 73.0 %    Lymph% 25.0 18.0 - 48.0 %    Mono% 6.4 4.0 - 15.0 %    Eosinophil% 2.3 0.0 - 8.0 %    Basophil% 0.5 0.0 - 1.9 %    Differential Method Automated    Comprehensive metabolic panel   Result Value Ref Range    Sodium 140 136 - 145 mmol/L    Potassium 4.0 3.5 - 5.1 mmol/L    Chloride 96 95 - 110 mmol/L    CO2 31 (H) 23 - 29 mmol/L    Glucose 100 70 - 110 mg/dL    BUN, Bld 10 8 - 23 mg/dL    Creatinine 0.9 0.5 - 1.4 mg/dL    Calcium 9.2 8.7 - 10.5 mg/dL    Total Protein 7.4 6.0 - 8.4 g/dL    Albumin 3.9 3.5 - 5.2 g/dL    Total Bilirubin 0.3 0.1 - 1.0 mg/dL    Alkaline Phosphatase 82 55 - 135 U/L    AST 16 10 - 40 U/L    ALT 10 10 - 44 U/L    Anion Gap 13 8 - 16 mmol/L    eGFR if African American >60 >60 mL/min/1.73 m^2    eGFR if non African American >60 >60 mL/min/1.73 m^2   Brain natriuretic peptide   Result Value Ref Range    BNP 45 0 - 99 pg/mL   Troponin I   Result Value Ref Range    Troponin I <0.006 0.000 - 0.026 ng/mL   D dimer, quantitative   Result Value Ref Range    D-Dimer 0.35 <0.50 mg/L FEU   Urinalysis, Reflex to Urine Culture Urine, Clean Catch   Result Value Ref Range    Specimen UA Urine, Clean Catch     Color, UA Yellow Yellow, Straw, Blanca    Appearance, UA Clear Clear    pH, UA 6.0 5.0 - 8.0    Specific Gravity, UA <=1.005 (A) 1.005 - 1.030    Protein, UA Negative Negative    Glucose, UA Negative Negative    Ketones, UA Negative Negative    Bilirubin (UA) Negative Negative    Occult Blood UA Negative Negative    Nitrite, UA Negative Negative    Urobilinogen, UA Negative <2.0 EU/dL    Leukocytes, UA Negative Negative   ISTAT PROCEDURE   Result Value Ref Range    POC PH 7.489 (H) 7.35 - 7.45    POC PCO2 51.4 (H)  35 - 45 mmHg    POC PO2 31 (LL) 80 - 100 mmHg    POC HCO3 39.0 (H) 24 - 28 mmol/L    POC BE 16 -2 to 2 mmol/L    POC SATURATED O2 63 (L) 95 - 100 %    Sample ARTERIAL     Site RR     Allens Test Pass     DelSys Nasal Can     Mode SPONT     FiO2 28          Imaging Results:  Imaging Results          X-Ray Chest 1 View (Final result)  Result time 12/07/19 22:12:47    Final result by Zuhair Medina MD (12/07/19 22:12:47)                 Impression:      No acute findings.      Electronically signed by: Zuhair Medina MD  Date:    12/07/2019  Time:    22:12             Narrative:    EXAMINATION:  XR CHEST 1 VIEW    CLINICAL HISTORY:  Shortness of breath.,    COMPARISON:  02/27/2019    FINDINGS:  The heart size is normal.  Lung fields are clear with minor interstitial coarsening notably at the left lung base, similar to the previous study.    Stable symmetric apical pleural thickening.                                 The EKG was ordered, reviewed, and independently interpreted by the ED provider.  Interpretation time: 22:10  Rate: 87 BPM  Rhythm: normal sinus rhythm  Interpretation: Right atrial enlargement. Borderline ECG. No STEMI.  When compared to EKG performed 2/27/19, there are no significant changes.           The Emergency Provider reviewed the vital signs and test results, which are outlined above.     ED Discussion     11:03 PM: Reassessed pt at this time. Pt admits to not wearing her home oxygen. I encouraged pt to wear her home oxygen. Pt states her condition has improved at this time. Discussed with pt all pertinent ED information and results. Discussed pt dx and plan of tx. Gave pt all f/u and return to the ED instructions. All questions and concerns were addressed at this time. Pt expresses understanding of information and instructions, and is comfortable with plan to discharge. Pt is stable for discharge.    I discussed with patient and/or family/caretaker that evaluation in the ED does not  suggest any emergent or life threatening medical conditions requiring immediate intervention beyond what was provided in the ED, and I believe patient is safe for discharge.  Regardless, an unremarkable evaluation in the ED does not preclude the development or presence of a serious of life threatening condition. As such, patient was instructed to return immediately for any worsening or change in current symptoms.         Medical Decision Making:   Clinical Tests:   Lab Tests: Ordered and Reviewed  Radiological Study: Ordered and Reviewed  Medical Tests: Ordered and Reviewed           ED Medication(s):  Medications   methylPREDNISolone sodium succinate injection 125 mg (125 mg Intravenous Given 12/7/19 2205)   albuterol-ipratropium 2.5 mg-0.5 mg/3 mL nebulizer solution 3 mL (3 mLs Nebulization Given 12/7/19 2200)       New Prescriptions    PREDNISONE (DELTASONE) 20 MG TABLET    Take 2 tablets (40 mg total) by mouth once daily. for 5 days       Follow-up Information     Ruth Ann Zhu MD. Call in 2 days.    Specialty:  Family Medicine  Contact information:  [address] [place] [id number] [phone]             Pulmonary. Call in 1 day.                     Scribe Attestation:   Scribe #1: I performed the above scribed service and the documentation accurately describes the services I performed. I attest to the accuracy of the note.     Attending:   Physician Attestation Statement for Scribe #1: I, Scott Garcia MD, personally performed the services described in this documentation, as scribed by Shazia Reed, in my presence, and it is both accurate and complete.           Clinical Impression       ICD-10-CM ICD-9-CM   1. COPD exacerbation J44.1 491.21   2. SOB (shortness of breath) R06.02 786.05       Disposition:   Disposition: Discharged  Condition: Stable         Scott Garcia MD  12/08/19 [id number]

## 2019-12-13 LAB — BACTERIA BLD CULT: NORMAL
